# Patient Record
Sex: MALE | Race: WHITE | NOT HISPANIC OR LATINO | Employment: FULL TIME | URBAN - METROPOLITAN AREA
[De-identification: names, ages, dates, MRNs, and addresses within clinical notes are randomized per-mention and may not be internally consistent; named-entity substitution may affect disease eponyms.]

---

## 2017-02-27 DIAGNOSIS — E78.2 MIXED HYPERLIPIDEMIA: ICD-10-CM

## 2017-02-27 NOTE — TELEPHONE ENCOUNTER
Kristopher is requesting a refill of the following med:  Pending Prescriptions:                       Disp   Refills    simvastatin (ZOCOR) 40 MG tablet          90 tab*1            Sig: Take 1 tablet (40 mg) by mouth daily    Last Refill: 12/04/2016  Last Office Visit: 09/02/2016 with instructions to follow up with PCP in 1 year  Scheduled Office Visit: 03/24/2017 (Pre Op)    BJS can you sign for JCC?  Please Close Encounter If Approved.    Thank You,  Codie Terrazas, RASHEED

## 2017-02-28 RX ORDER — SIMVASTATIN 40 MG
40 TABLET ORAL DAILY
Qty: 90 TABLET | Refills: 1 | Status: SHIPPED | OUTPATIENT
Start: 2017-02-28 | End: 2017-09-22

## 2017-03-31 ENCOUNTER — TELEPHONE (OUTPATIENT)
Dept: SURGERY | Facility: CLINIC | Age: 57
End: 2017-03-31

## 2017-03-31 ENCOUNTER — OFFICE VISIT (OUTPATIENT)
Dept: FAMILY MEDICINE | Facility: CLINIC | Age: 57
End: 2017-03-31

## 2017-03-31 VITALS
DIASTOLIC BLOOD PRESSURE: 80 MMHG | HEART RATE: 57 BPM | WEIGHT: 267 LBS | BODY MASS INDEX: 33.2 KG/M2 | HEIGHT: 75 IN | TEMPERATURE: 97.7 F | OXYGEN SATURATION: 95 % | SYSTOLIC BLOOD PRESSURE: 110 MMHG

## 2017-03-31 DIAGNOSIS — J30.9 ALLERGIC RHINITIS, UNSPECIFIED ALLERGIC RHINITIS TRIGGER, UNSPECIFIED RHINITIS SEASONALITY: ICD-10-CM

## 2017-03-31 DIAGNOSIS — M17.11 PRIMARY OSTEOARTHRITIS OF RIGHT KNEE: ICD-10-CM

## 2017-03-31 DIAGNOSIS — N52.9 ERECTILE DYSFUNCTION, UNSPECIFIED ERECTILE DYSFUNCTION TYPE: ICD-10-CM

## 2017-03-31 DIAGNOSIS — K40.90 RIGHT INGUINAL HERNIA: ICD-10-CM

## 2017-03-31 DIAGNOSIS — Z01.818 PREOP GENERAL PHYSICAL EXAM: Primary | ICD-10-CM

## 2017-03-31 DIAGNOSIS — E78.2 MIXED HYPERLIPIDEMIA: ICD-10-CM

## 2017-03-31 LAB
ERYTHROCYTE [DISTWIDTH] IN BLOOD BY AUTOMATED COUNT: 12.2 %
HCT VFR BLD AUTO: 46.3 % (ref 40–53)
HEMOGLOBIN: 15.8 G/DL (ref 13.3–17.7)
MCH RBC QN AUTO: 29.8 PG (ref 26–33)
MCHC RBC AUTO-ENTMCNC: 34.1 G/DL (ref 31–36)
MCV RBC AUTO: 87.4 FL (ref 78–100)
PLATELET COUNT - QUEST: 159 10^9/L (ref 150–375)
RBC # BLD AUTO: 5.3 10*12/L (ref 4.4–5.9)
WBC # BLD AUTO: 6.3 10*9/L (ref 4–11)

## 2017-03-31 PROCEDURE — 85027 COMPLETE CBC AUTOMATED: CPT | Performed by: PHYSICIAN ASSISTANT

## 2017-03-31 PROCEDURE — 99214 OFFICE O/P EST MOD 30 MIN: CPT | Performed by: PHYSICIAN ASSISTANT

## 2017-03-31 PROCEDURE — 36415 COLL VENOUS BLD VENIPUNCTURE: CPT | Performed by: PHYSICIAN ASSISTANT

## 2017-03-31 PROCEDURE — 93000 ELECTROCARDIOGRAM COMPLETE: CPT | Performed by: PHYSICIAN ASSISTANT

## 2017-03-31 NOTE — LETTER
[facility]  [address]  [address]  [facility] [address]  [phone]  Dept: [phone]    PRE-OP EVALUATION:  Today's date: 3/31/2017    Kristopher Gomez (: 1960) presents for pre-operative evaluation assessment as requested by Dr. Ford.  He requires evaluation and anesthesia risk assessment prior to undergoing surgery/procedure for treatment of osteoarthritis right knee.  Proposed procedure: Total knee replacement right.     Date of Surgery/ Procedure: 2017  Time of Surgery/ Procedure:   Hospital/Surgical Facility: [facility]  Fax number for surgical facility: [phone]  Primary Physician: Talha Licona  Type of Anesthesia Anticipated: to be determined    Patient has a Health Care Directive or Living Will:  YES     2. NO - Do you ever have any pain or discomfort in your chest?  3. NO - Do you have a history of  Heart Failure?  4. NO - Are you troubled by shortness of breath when: walking on the level, up a slight hill or at night?  5. NO - Do you currently have a cold, bronchitis or other respiratory infection?  6. NO - Do you have a cough, shortness of breath or wheezing?  7. NO - Do you sometimes get pains in the calves of your legs when you walk?  8. NO - Do you or anyone in your family have previous history of blood clots?   9. NO - Do you or does anyone in your family have a serious bleeding problem such as prolonged bleeding following surgeries or cuts?  10. NO - Have you ever had problems with anemia or been told to take iron pills?  11. NO - Have you had any abnormal blood loss such as black, tarry or bloody stools, or abnormal vaginal bleeding?  12. NO - Have you ever had a blood transfusion?  13. NO - Have you or any of your relatives ever had problems with anesthesia?  14. NO - Do you have sleep apnea, excessive snoring or daytime drowsiness?  15. NO - Do you have any prosthetic heart valves?  16. NO - Do you  have prosthetic joints?  17. NO - Is there any chance that you may be pregnant?      HPI:                                                      Brief HPI related to upcoming procedure: Chronic pain of right knee      HYPERLIPIDEMIA - Patient has a long history of significant Hyperlipidemia requiring medication for treatment with recent good control. Patient reports no problems or side effects with the medication.                                                                                                                                                       .    MEDICAL HISTORY:                                                      Patient Active Problem List    Diagnosis Date Noted     Mixed hyperlipidemia 05/23/2003     Priority: High     Erectile dysfunction, unspecified erectile dysfunction type 09/02/2016     Priority: Medium     Right inguinal hernia 02/29/2016     Priority: Medium     Found in ED 2/16, CT proven       Allergic rhinitis 03/11/2014     Priority: Medium     Uses zyrtec         Family history of cardiovascular disease 05/18/2012     Priority: Medium     Primary osteoarthritis of right knee 09/07/2005     Priority: Medium     ACP (advance care planning) 12/05/2012     Priority: Low      Advance Care Planning 9/2/2016: ACP Review of Chart / Resources Provided:  Reviewed chart for advance care plan.  Kristopher Gomez has   Added by [facility] 12/05/2012     Priority: Low     State Tier Level:  Tier 1  Status:  n/a  Care Coordinator:   See Letters for [facility] Care Plan              Past Medical History:   Diagnosis Date     MIXED HYPERLIPIDEMIA 5/23/2003     Other internal derangement of knee(717.89) 1980    right medial meniscus tear s/p arthroscopy, debridement     Past Surgical History:   Procedure Laterality Date     HC FEMUR/KNEE SURG UNLISTED Right 1980    debridement of meniscal tear     HC VASECTOMY UNILAT/BILAT W POSTOP SEMEN      Vasectomy     Current  "Outpatient Prescriptions   Medication Sig Dispense Refill     simvastatin (ZOCOR) 40 MG tablet Take 1 tablet (40 mg) by mouth daily 90 tablet 1     fluticasone (FLONASE) 50 MCG/ACT nasal spray Spray 1-2 sprays into both nostrils daily 16 g 1     OTC products: Celebrex last night.    Allergies   Allergen Reactions     No Known Allergies       Latex Allergy: NO    Social History   Substance Use Topics     Smoking status: Never Smoker     Smokeless tobacco: Never Used     Alcohol use 1.0 oz/week     2 drink(s) per week     History   Drug Use No       REVIEW OF SYSTEMS:                                                    Constitutional, neuro, ENT, endocrine, pulmonary, cardiac, gastrointestinal, genitourinary, musculoskeletal, integument and psychiatric systems are negative, except as otherwise noted.    EXAM:                                                    /80 (BP Location: Left arm, Patient Position: Chair, Cuff Size: Adult Regular)  Pulse 57  Temp 97.7  F (36.5  C) (Oral)  Ht 1.905 m (6' 3\")  Wt 121.1 kg (267 lb)  SpO2 95%  BMI 33.37 kg/m2    GENERAL APPEARANCE: healthy, alert and no distress     EYES: EOMI,  PERRL     HENT: ear canals and TM's normal and nose and mouth without ulcers or lesions     NECK: no adenopathy, no asymmetry, masses, or scars and thyroid normal to palpation     RESP: lungs clear to auscultation - no rales, rhonchi or wheezes     CV: regular rates and rhythm, normal S1 S2, no S3 or S4 and no murmur, click or rub     ABDOMEN:  soft, nontender, no HSM or masses and bowel sounds normal     MS: extremities normal- no gross deformities noted, no evidence of inflammation in joints, FROM in all extremities.     SKIN: no suspicious lesions or rashes     NEURO: Normal strength and tone, sensory exam grossly normal, mentation intact and speech normal     PSYCH: mentation appears normal. and affect normal/bright    DIAGNOSTICS:                                                      EKG: " sinus bradycardia, normal axis, normal intervals, no acute ST/T changes c/w ischemia, no LVH by voltage criteria  Labs Resulted Today:   Results for orders placed or performed in visit on 03/31/17   HEMOGRAM/PLATELET (BFP)   Result Value Ref Range    WBC 6.3 4.0 - 11 10*9/L    RBC Count 5.30 4.4 - 5.9 10*12/L    Hemoglobin 15.8 13.3 - 17.7 g/dL    Hematocrit 46.3 40.0 - 53.0 %    MCV 87.4 78 - 100 fL    MCH 29.8 26 - 33 pg    MCHC 34.1 31 - 36 g/dL    RDW 12.2 %    Platelet Count 159 150 - 375 10^9/L       Recent Labs   Lab Test  09/02/16   0835  02/27/16   2227  01/13/16   1240  05/12/15   0856   HGB   --   13.6  14.4   --    PLT   --    --   287   --    NA  142  143   --   139   POTASSIUM  4.8  3.6   --   4.9   CR  1.12   --    --   1.11        IMPRESSION:                                                    Reason for surgery/procedure: right knee total replacement  Diagnosis/reason for consult:  Preoperative clearance      The proposed surgical procedure is considered INTERMEDIATE risk.    REVISED CARDIAC RISK INDEX  The patient has the following serious cardiovascular risks for perioperative complications such as (MI, PE, VFib and 3  AV Block):  No serious cardiac risks  INTERPRETATION: 0 risks: Class I (very low risk - 0.4% complication rate)    The patient has the following additional risks for perioperative complications:  No identified additional risks      ICD-10-CM    1. Preop general physical exam Z01.818 EKG 12-lead complete w/read - Clinics     HEMOGRAM/PLATELET (BFP)     VENOUS COLLECTION   2. Mixed hyperlipidemia E78.2    3. Primary osteoarthritis of right knee M17.11    4. Allergic rhinitis, unspecified allergic rhinitis trigger, unspecified rhinitis seasonality J30.9    5. Right inguinal hernia K40.90    6. Erectile dysfunction, unspecified erectile dysfunction type N52.9        RECOMMENDATIONS:                                                        APPROVAL GIVEN to proceed with proposed procedure,  without further diagnostic evaluation       Signed Electronically by: Opal Tariq PA-C    Copy of this evaluation report is provided to requesting physician.    [place] Preop Guidelines

## 2017-03-31 NOTE — MR AVS SNAPSHOT
After Visit Summary   3/31/2017    Kristopher Gomez    MRN: [id number]           Patient Information     Date Of Birth          1960        Visit Information        Provider Department      3/31/2017 9:45 AM Opal Tariq PA [facility]        Today's Diagnoses     Preop general physical exam    -  1    Mixed hyperlipidemia        Primary osteoarthritis of right knee        Allergic rhinitis, unspecified allergic rhinitis trigger, unspecified rhinitis seasonality        Right inguinal hernia        Erectile dysfunction, unspecified erectile dysfunction type           Follow-ups after your visit        Additional Services     GENERAL SURG ADULT REFERRAL       Your provider has referred you to: [facility]: [facility] [place] [phone]   [email or web address]    Please be aware that coverage of these services is subject to the terms and limitations of your health insurance plan.  Call member services at your health plan with any benefit or coverage questions.      Please bring the following with you to your appointment:    (1) Any X-Rays, CTs or MRIs which have been performed.  Contact the facility where they were done to arrange for  prior to your scheduled appointment.  Any new CT, MRI or other procedures ordered by your specialist must be performed at a [place] facility or coordinated by your clinic's referral office.    (2) List of current medications   (3) This referral request   (4) Any documents/labs given to you for this referral                  Who to contact     If you have questions or need follow up information about today's clinic visit or your schedule please contact [facility] directly at [phone].  Normal or non-critical lab and imaging results will be communicated to you by MyChart, letter or phone within 4 business days after the clinic has received the  "results. If you do not hear from us within 7 days, please contact the clinic through [employer] or phone. If you have a critical or abnormal lab result, we will notify you by phone as soon as possible.  Submit refill requests through [employer] or call your pharmacy and they will forward the refill request to us. Please allow 3 business days for your refill to be completed.          Additional Information About Your Visit        [employer] Information     [employer] gives you secure access to your electronic health record. If you see a primary care provider, you can also send messages to your care team and make appointments. If you have questions, please call your primary care clinic.  If you do not have a primary care provider, please call [phone] and they will assist you.        Care EveryWhere ID     This is your Care EveryWhere ID. This could be used by other organizations to access your [place] medical records  [id number]        Your Vitals Were     Pulse Temperature Height Pulse Oximetry BMI (Body Mass Index)       57 97.7  F (36.5  C) (Oral) 1.905 m (6' 3\") 95% 33.37 kg/m2        Blood Pressure from Last 3 Encounters:   03/31/17 110/80   09/02/16 124/78   02/27/16 124/86    Weight from Last 3 Encounters:   03/31/17 121.1 kg (267 lb)   09/02/16 117.1 kg (258 lb 3.2 oz)   02/27/16 115.7 kg (255 lb)              We Performed the Following     EKG 12-lead complete w/read - Clinics     GENERAL SURG ADULT REFERRAL     HEMOGRAM/PLATELET (BFP)     VENOUS COLLECTION        Primary Care Provider Office Phone # Fax #    Talha Licona [phone]       [facility] [address] [facility] [address]  [facility] [address]        Thank you!     Thank you for choosing [facility]  for your care. Our goal is always to provide you with excellent care. Hearing back from our patients is one way we can continue to improve our services. Please take a few minutes to complete " the written survey that you may receive in the mail after your visit with us. Thank you!             Your Updated Medication List - Protect others around you: Learn how to safely use, store and throw away your medicines at www.disposemymeds.org.          This list is accurate as of: 3/31/17 10:36 AM.  Always use your most recent med list.                   Brand Name Dispense Instructions for use    fluticasone 50 MCG/ACT spray    FLONASE    16 g    Spray 1-2 sprays into both nostrils daily       simvastatin 40 MG tablet    ZOCOR    90 tablet    Take 1 tablet (40 mg) by mouth daily

## 2017-03-31 NOTE — PROGRESS NOTES
[facility]  [address]  [address]  [facility] [address]  [phone]  Dept: [phone]    PRE-OP EVALUATION:  Today's date: 3/31/2017    Kristopher Gomez (: 1960) presents for pre-operative evaluation assessment as requested by Dr. Ford.  He requires evaluation and anesthesia risk assessment prior to undergoing surgery/procedure for treatment of osteoarthritis right knee.  Proposed procedure: Total knee replacement right.     Date of Surgery/ Procedure: 2017  Time of Surgery/ Procedure:   Hospital/Surgical Facility: [facility]  Fax number for surgical facility: [phone]  Primary Physician: Talha Licona  Type of Anesthesia Anticipated: to be determined    Patient has a Health Care Directive or Living Will:  YES     2. NO - Do you ever have any pain or discomfort in your chest?  3. NO - Do you have a history of  Heart Failure?  4. NO - Are you troubled by shortness of breath when: walking on the level, up a slight hill or at night?  5. NO - Do you currently have a cold, bronchitis or other respiratory infection?  6. NO - Do you have a cough, shortness of breath or wheezing?  7. NO - Do you sometimes get pains in the calves of your legs when you walk?  8. NO - Do you or anyone in your family have previous history of blood clots?   9. NO - Do you or does anyone in your family have a serious bleeding problem such as prolonged bleeding following surgeries or cuts?  10. NO - Have you ever had problems with anemia or been told to take iron pills?  11. NO - Have you had any abnormal blood loss such as black, tarry or bloody stools, or abnormal vaginal bleeding?  12. NO - Have you ever had a blood transfusion?  13. NO - Have you or any of your relatives ever had problems with anesthesia?  14. NO - Do you have sleep apnea, excessive snoring or daytime drowsiness?  15. NO - Do you have any prosthetic heart valves?  16. NO - Do you  have prosthetic joints?  17. NO - Is there any chance that you may be pregnant?      HPI:                                                      Brief HPI related to upcoming procedure: Chronic pain of right knee      HYPERLIPIDEMIA - Patient has a long history of significant Hyperlipidemia requiring medication for treatment with recent good control. Patient reports no problems or side effects with the medication.                                                                                                                                                       .    MEDICAL HISTORY:                                                      Patient Active Problem List    Diagnosis Date Noted     Mixed hyperlipidemia 05/23/2003     Priority: High     Erectile dysfunction, unspecified erectile dysfunction type 09/02/2016     Priority: Medium     Right inguinal hernia 02/29/2016     Priority: Medium     Found in ED 2/16, CT proven       Allergic rhinitis 03/11/2014     Priority: Medium     Uses zyrtec         Family history of cardiovascular disease 05/18/2012     Priority: Medium     Primary osteoarthritis of right knee 09/07/2005     Priority: Medium     ACP (advance care planning) 12/05/2012     Priority: Low      Advance Care Planning 9/2/2016: ACP Review of Chart / Resources Provided:  Reviewed chart for advance care plan.  Kristopher Gomez has   Added by [facility] 12/05/2012     Priority: Low     State Tier Level:  Tier 1  Status:  n/a  Care Coordinator:   See Letters for [facility] Care Plan              Past Medical History:   Diagnosis Date     MIXED HYPERLIPIDEMIA 5/23/2003     Other internal derangement of knee(717.89) 1980    right medial meniscus tear s/p arthroscopy, debridement     Past Surgical History:   Procedure Laterality Date     HC FEMUR/KNEE SURG UNLISTED Right 1980    debridement of meniscal tear     HC VASECTOMY UNILAT/BILAT W POSTOP SEMEN      Vasectomy     Current  "Outpatient Prescriptions   Medication Sig Dispense Refill     simvastatin (ZOCOR) 40 MG tablet Take 1 tablet (40 mg) by mouth daily 90 tablet 1     fluticasone (FLONASE) 50 MCG/ACT nasal spray Spray 1-2 sprays into both nostrils daily 16 g 1     OTC products: Celebrex last night.    Allergies   Allergen Reactions     No Known Allergies       Latex Allergy: NO    Social History   Substance Use Topics     Smoking status: Never Smoker     Smokeless tobacco: Never Used     Alcohol use 1.0 oz/week     2 drink(s) per week     History   Drug Use No       REVIEW OF SYSTEMS:                                                    Constitutional, neuro, ENT, endocrine, pulmonary, cardiac, gastrointestinal, genitourinary, musculoskeletal, integument and psychiatric systems are negative, except as otherwise noted.    EXAM:                                                    /80 (BP Location: Left arm, Patient Position: Chair, Cuff Size: Adult Regular)  Pulse 57  Temp 97.7  F (36.5  C) (Oral)  Ht 1.905 m (6' 3\")  Wt 121.1 kg (267 lb)  SpO2 95%  BMI 33.37 kg/m2    GENERAL APPEARANCE: healthy, alert and no distress     EYES: EOMI,  PERRL     HENT: ear canals and TM's normal and nose and mouth without ulcers or lesions     NECK: no adenopathy, no asymmetry, masses, or scars and thyroid normal to palpation     RESP: lungs clear to auscultation - no rales, rhonchi or wheezes     CV: regular rates and rhythm, normal S1 S2, no S3 or S4 and no murmur, click or rub     ABDOMEN:  soft, nontender, no HSM or masses and bowel sounds normal     MS: extremities normal- no gross deformities noted, no evidence of inflammation in joints, FROM in all extremities.     SKIN: no suspicious lesions or rashes     NEURO: Normal strength and tone, sensory exam grossly normal, mentation intact and speech normal     PSYCH: mentation appears normal. and affect normal/bright    DIAGNOSTICS:                                                      EKG: " sinus bradycardia, normal axis, normal intervals, no acute ST/T changes c/w ischemia, no LVH by voltage criteria  Labs Resulted Today:   Results for orders placed or performed in visit on 03/31/17   HEMOGRAM/PLATELET (BFP)   Result Value Ref Range    WBC 6.3 4.0 - 11 10*9/L    RBC Count 5.30 4.4 - 5.9 10*12/L    Hemoglobin 15.8 13.3 - 17.7 g/dL    Hematocrit 46.3 40.0 - 53.0 %    MCV 87.4 78 - 100 fL    MCH 29.8 26 - 33 pg    MCHC 34.1 31 - 36 g/dL    RDW 12.2 %    Platelet Count 159 150 - 375 10^9/L       Recent Labs   Lab Test  09/02/16   0835  02/27/16   2227  01/13/16   1240  05/12/15   0856   HGB   --   13.6  14.4   --    PLT   --    --   287   --    NA  142  143   --   139   POTASSIUM  4.8  3.6   --   4.9   CR  1.12   --    --   1.11        IMPRESSION:                                                    Reason for surgery/procedure: right knee total replacement  Diagnosis/reason for consult:  Preoperative clearance      The proposed surgical procedure is considered INTERMEDIATE risk.    REVISED CARDIAC RISK INDEX  The patient has the following serious cardiovascular risks for perioperative complications such as (MI, PE, VFib and 3  AV Block):  No serious cardiac risks  INTERPRETATION: 0 risks: Class I (very low risk - 0.4% complication rate)    The patient has the following additional risks for perioperative complications:  No identified additional risks      ICD-10-CM    1. Preop general physical exam Z01.818 EKG 12-lead complete w/read - Clinics     HEMOGRAM/PLATELET (BFP)     VENOUS COLLECTION   2. Mixed hyperlipidemia E78.2    3. Primary osteoarthritis of right knee M17.11    4. Allergic rhinitis, unspecified allergic rhinitis trigger, unspecified rhinitis seasonality J30.9    5. Right inguinal hernia K40.90    6. Erectile dysfunction, unspecified erectile dysfunction type N52.9        RECOMMENDATIONS:                                                        APPROVAL GIVEN to proceed with proposed procedure,  without further diagnostic evaluation       Signed Electronically by: Opal Tariq PA-C    Copy of this evaluation report is provided to requesting physician.    [place] Preop Guidelines

## 2017-04-06 NOTE — TELEPHONE ENCOUNTER
Attempt #2:    Called patient at [phone] .  No answer - left message for patient to return call to clinic at [phone].  Imelda

## 2017-04-07 ENCOUNTER — OFFICE VISIT (OUTPATIENT)
Dept: FAMILY MEDICINE | Facility: CLINIC | Age: 57
End: 2017-04-07

## 2017-04-07 VITALS
OXYGEN SATURATION: 95 % | DIASTOLIC BLOOD PRESSURE: 70 MMHG | HEART RATE: 76 BPM | SYSTOLIC BLOOD PRESSURE: 110 MMHG | TEMPERATURE: 98 F | RESPIRATION RATE: 16 BRPM

## 2017-04-07 DIAGNOSIS — R50.9 FEVER, UNSPECIFIED: Primary | ICD-10-CM

## 2017-04-07 LAB
ERYTHROCYTE [DISTWIDTH] IN BLOOD BY AUTOMATED COUNT: 12 %
HCT VFR BLD AUTO: 33.5 % (ref 40–53)
HEMOGLOBIN: 11 G/DL (ref 13.3–17.7)
MCH RBC QN AUTO: 28.9 PG (ref 26–33)
MCHC RBC AUTO-ENTMCNC: 32.8 G/DL (ref 31–36)
MCV RBC AUTO: 87.9 FL (ref 78–100)
PLATELET COUNT - QUEST: 252 10^9/L (ref 150–375)
RBC # BLD AUTO: 3.81 10*12/L (ref 4.4–5.9)
WBC # BLD AUTO: 12.2 10*9/L (ref 4–11)

## 2017-04-07 PROCEDURE — 99213 OFFICE O/P EST LOW 20 MIN: CPT | Performed by: PHYSICIAN ASSISTANT

## 2017-04-07 PROCEDURE — 36415 COLL VENOUS BLD VENIPUNCTURE: CPT | Performed by: PHYSICIAN ASSISTANT

## 2017-04-07 PROCEDURE — 85027 COMPLETE CBC AUTOMATED: CPT | Performed by: PHYSICIAN ASSISTANT

## 2017-04-07 RX ORDER — MORPHINE SULFATE 30 MG/1
TABLET, FILM COATED, EXTENDED RELEASE ORAL
COMMUNITY
Start: 2017-04-05 | End: 2017-09-22

## 2017-04-07 RX ORDER — CEPHALEXIN 500 MG/1
CAPSULE ORAL
COMMUNITY
Start: 2017-04-05 | End: 2017-09-22

## 2017-04-07 RX ORDER — OXYCODONE HYDROCHLORIDE 5 MG/1
TABLET ORAL
COMMUNITY
Start: 2017-04-05 | End: 2017-09-22

## 2017-04-07 RX ORDER — ONDANSETRON 8 MG/1
TABLET, FILM COATED ORAL
Refills: 0 | COMMUNITY
Start: 2017-02-06 | End: 2017-09-22

## 2017-04-07 NOTE — PROGRESS NOTES
CC: Fever, hypoxia    History:  This past Wed, Kristopher had TKR of right knee through [facility]. Since yesterday when he got home he and he wife Giuliana noticed he was short of breath and he could hear and feel a rattling in his airway. This rattling and shortness of breath get worse when laying down. Through [facility], Kristopher has a home care nurse that comes to check on him. Earlier today he was found to have a fever of 102 degrees at 1 o'clock, and his oxygen level was 82%. Has been between  since that time, and shortness of breath has been improving as well. Took 500 mg Tylenol at 9 and 1. Kristopher is also on morphine and oxycodone per instructions from his ortho team he will stop morphine tomorrow. He is also on Keflex for prophylaxis.     No history of pulmonary disease, blood clots. Wearing compression boots to help prevent clot. No pain in legs currently. Right knee feels good.    PMH, MEDICATIONS, ALLERGIES, SOCIAL AND FAMILY HISTORY in [facility] and reviewed by me personally.      ROS negative other than the symptoms noted above in the HPI.        Examination   /70 (BP Location: Right arm, Patient Position: Chair, Cuff Size: Adult Large)  Pulse 76  Temp 98  F (36.7  C) (Oral)  Resp 16  SpO2 95%       Constitutional: Sitting comfortably, in no acute distress. Vital signs noted. Hypoxia improved to normal. Temperature normal.  Cardiovascular:  regular rate and rhythm, no murmurs, clicks, or gallops  Respiratory:  normal respiratory rate and rhythm, lungs clear to auscultation  Psychiatric: mentation appears normal and affect normal/bright        A/P    ICD-10-CM    1. Fever, unspecified R50.9 HEMOGRAM/PLATELET (BFP)     VENOUS COLLECTION       DISCUSSION: CBC shows hemoglobin of 11, which per pt is what it was when he was discharged. Also shows mildly elevated WBC of 12, which he believes is similar to at discharge. Explained to Kristopher and wife Giuliana that his improved vital signs in office today compared to during home  care are reassuring. He is no longer feeling short of breath, although I did ask them to monitor closely tonight, as it tends to get worse at night. He should sleep elevated.     I encouraged them to spread out morphine and oxycodone doses as much as possible to limit respiratory depression. I do not think Kristopher needs to proceed to ER at this time, but if his shortness of breath returns and even worsens, they should proceed to the ER. His WBC was borderline so I could not rule out infection despite Keflex, so should monitor for signs of sweats, breakthrough fever despite regular Tylenol use, spreading redness, and if they have any concerns they can contact us as well.     follow up visit: As needed    Miesha Lyons PA-C  [facility]

## 2017-04-07 NOTE — NURSING NOTE
Patient is here for a fever that he has had since surgery on Wed - O2 was a little low - has been under 100 till today it went over 102 then took Tylenol.  Pre-Visit Screening not done today.  Pulse - regular  My Chart - accepts    CLASSIFICATION OF OVERWEIGHT AND OBESITY BY BMI                         Obesity Class           BMI(kg/m2)  Underweight                                    < 18.5  Normal                                         18.5-24.9  Overweight                                     25.0-29.9  OBESITY                     I                  30.0-34.9                              II                 35.0-39.9  EXTREME OBESITY             III                >40                             Patient's  BMI There is no height or weight on file to calculate BMI.  http://hin.nhlbi.nih.gov/menuplanner/menu.cgi  Questioned patient about current smoking habits.  Pt. has never smoked.      Patient was unable to have their weight and height checked for the following reason knee surgery and because of this we are not able to calculate a BMI.

## 2017-04-07 NOTE — MR AVS SNAPSHOT
After Visit Summary   4/7/2017    Kristopher Gomez    MRN: [id number]           Patient Information     Date Of Birth          1960        Visit Information        Provider Department      4/7/2017 3:00 PM Miesha Lyons PA-C [facility]        Today's Diagnoses     Fever, unspecified    -  1       Follow-ups after your visit        Follow-up notes from your care team     Return if symptoms worsen or fail to improve.      Who to contact     If you have questions or need follow up information about today's clinic visit or your schedule please contact [facility] directly at [phone].  Normal or non-critical lab and imaging results will be communicated to you by MyChart, letter or phone within 4 business days after the clinic has received the results. If you do not hear from us within 7 days, please contact the clinic through Open-Xchangehart or phone. If you have a critical or abnormal lab result, we will notify you by phone as soon as possible.  Submit refill requests through M.dot or call your pharmacy and they will forward the refill request to us. Please allow 3 business days for your refill to be completed.          Additional Information About Your Visit        MyChart Information     M.dot gives you secure access to your electronic health record. If you see a primary care provider, you can also send messages to your care team and make appointments. If you have questions, please call your primary care clinic.  If you do not have a primary care provider, please call [phone] and they will assist you.        Care EveryWhere ID     This is your Care EveryWhere ID. This could be used by other organizations to access your [place] medical records  [id number]        Your Vitals Were     Pulse Temperature Respirations Pulse Oximetry          76 98  F (36.7  C) (Oral) 16 95%         Blood Pressure from Last 3 Encounters:   04/07/17 110/70    03/31/17 110/80   09/02/16 124/78    Weight from Last 3 Encounters:   03/31/17 121.1 kg (267 lb)   09/02/16 117.1 kg (258 lb 3.2 oz)   02/27/16 115.7 kg (255 lb)              We Performed the Following     HEMOGRAM/PLATELET (BFP)     VENOUS COLLECTION        Primary Care Provider Office Phone # Fax #    Talha Licona [phone]       [facility] [address] [facility] [address]  [facility] [address]        Thank you!     Thank you for choosing [facility]  for your care. Our goal is always to provide you with excellent care. Hearing back from our patients is one way we can continue to improve our services. Please take a few minutes to complete the written survey that you may receive in the mail after your visit with us. Thank you!             Your Updated Medication List - Protect others around you: Learn how to safely use, store and throw away your medicines at www.disposemymeds.org.          This list is accurate as of: 4/7/17  6:08 PM.  Always use your most recent med list.                   Brand Name Dispense Instructions for use    aspirin 81 MG tablet      Take 81 mg by mouth 2 times daily       CALCIUM 600 PO      Take 1,800 mg by mouth       cephALEXin 500 MG capsule    KEFLEX         fluticasone 50 MCG/ACT spray    FLONASE    16 g    Spray 1-2 sprays into both nostrils daily       morphine 30 MG 12 hr tablet    MS CONTIN         ondansetron 8 MG tablet    ZOFRAN     TAKE 1 TABLET BY MOUTH EVERY 8 HOURS AS NEEDED FOR NAUSEA.       oxyCODONE 5 MG IR tablet    ROXICODONE         simvastatin 40 MG tablet    ZOCOR    90 tablet    Take 1 tablet (40 mg) by mouth daily       TYLENOL PO      Take 500 mg by mouth       VITAMIN D-3 PO

## 2017-04-18 NOTE — TELEPHONE ENCOUNTER
Attempt #3:    Called patient at [phone] .  No answer - left message for patient to return call to clinic at [phone].      No return call received from patient - closing encounter. Imelda

## 2017-04-20 ENCOUNTER — TRANSFERRED RECORDS (OUTPATIENT)
Dept: FAMILY MEDICINE | Facility: CLINIC | Age: 57
End: 2017-04-20

## 2017-05-04 ENCOUNTER — OFFICE VISIT (OUTPATIENT)
Dept: SURGERY | Facility: CLINIC | Age: 57
End: 2017-05-04
Payer: COMMERCIAL

## 2017-05-04 VITALS
HEART RATE: 90 BPM | BODY MASS INDEX: 32.33 KG/M2 | DIASTOLIC BLOOD PRESSURE: 80 MMHG | HEIGHT: 75 IN | OXYGEN SATURATION: 98 % | SYSTOLIC BLOOD PRESSURE: 138 MMHG | WEIGHT: 260 LBS

## 2017-05-04 DIAGNOSIS — K40.90 RIGHT INGUINAL HERNIA: Primary | ICD-10-CM

## 2017-05-04 PROCEDURE — 99203 OFFICE O/P NEW LOW 30 MIN: CPT | Performed by: SURGERY

## 2017-05-04 ASSESSMENT — ENCOUNTER SYMPTOMS: ARTHRALGIAS: 1

## 2017-05-04 NOTE — LETTER
May 4, 2017      RE:  Kristopher Gomez-:  [birth year]    HPI:  Kristopher is a 56 year old male who presents for evaluation of right groin bulge.  He feels pressure and movement particularly with physical activity or coughing. The patient has noticed a bulge. The patient has not had a previous herniorrhaphy in this location. Employment does not require heavy lifting.     Constipation: No  Colonoscopy: Yes   Dysuria: No  Cough: No  Diabetes: No     Past Medical History:  Has a past medical history of MIXED HYPERLIPIDEMIA (2003) and Other internal derangement of knee(717.89) ().     ROS:  The 10 point review of systems is negative other than noted in the HPI and above.     PE:    General- Well-developed, well-nourished, patient able to get up on table without difficulty.  HEENT- Normocephalic and atraumatic. Pupils equal and round. Mucous membranes moist. Sclera are nonicteric.  Neck- No lymphadenopathy or masses   Respirations- are regular and non labored  Abdomen is abdomen is soft without significant tenderness, masses, organomegaly or guarding  Hernia- Left inguinal hernia is not present with valsalva  Right inguinal hernia is present with valsalva  The hernia is reducible  Testicles are normal  Varix- bilateral present     Imaging: CT 2016 shows a small right inguinal hernia with a loop of nondilated small bowel within it.     Assesment:     Plan:   We have discussed observation, reduction techniques and importance, incarceration and strangulation signs, symptoms and importance as well as need to seek emergency treatment.   We have discussed surgery in detail, including risk, benefits, complications, mesh, infection, nerve and cord damage and their sequelae including chronic pain and testicular loss, lifting and activity limits after surgery. He has been given literature to review. We will schedule surgery at patient's convenience.        Reginaldo Bearden MD

## 2017-05-04 NOTE — PROGRESS NOTES
HPI      ROS (Review of Systems):      Positive for System Review.   Cardiovascular: Positive for hyperlipidemia.   MUSCULOSKELETAL: Positive for arthralgias.  System Review has been done        Physical Exam

## 2017-05-04 NOTE — MR AVS SNAPSHOT
"              After Visit Summary   5/4/2017    Kristopher Gomez    MRN: [id number]           Patient Information     Date Of Birth          1960        Visit Information        Provider Department      5/4/2017 2:30 PM Reginaldo Bearden MD [facility]      Today's Diagnoses     Right inguinal hernia    -  1       Follow-ups after your visit        Who to contact     If you have questions or need follow up information about today's clinic visit or your schedule please contact [facility] directly at [phone].  Normal or non-critical lab and imaging results will be communicated to you by [employer] Grouphart, letter or phone within 4 business days after the clinic has received the results. If you do not hear from us within 7 days, please contact the clinic through DataLockert or phone. If you have a critical or abnormal lab result, we will notify you by phone as soon as possible.  Submit refill requests through Tae[employer] Reseach or call your pharmacy and they will forward the refill request to us. Please allow 3 business days for your refill to be completed.          Additional Information About Your Visit        MyChart Information     T[employer] Reseach gives you secure access to your electronic health record. If you see a primary care provider, you can also send messages to your care team and make appointments. If you have questions, please call your primary care clinic.  If you do not have a primary care provider, please call [phone] and they will assist you.        Care EveryWhere ID     This is your Care EveryWhere ID. This could be used by other organizations to access your [place] medical records  [id number]        Your Vitals Were     Pulse Height Pulse Oximetry BMI (Body Mass Index)          90 6' 3\" (1.905 m) 98% 32.5 kg/m2         Blood Pressure from Last 3 Encounters:   05/04/17 138/80   04/07/17 110/70   03/31/17 110/80    Weight from Last 3 " Encounters:   05/04/17 260 lb (117.9 kg)   03/31/17 267 lb (121.1 kg)   09/02/16 258 lb 3.2 oz (117.1 kg)              Today, you had the following     No orders found for display       Primary Care Provider Office Phone # Fax #    Talha Licona [phone]       [facility] [address] [facility] [address]  [facility] [address]        Thank you!     Thank you for choosing [facility]  for your care. Our goal is always to provide you with excellent care. Hearing back from our patients is one way we can continue to improve our services. Please take a few minutes to complete the written survey that you may receive in the mail after your visit with us. Thank you!             Your Updated Medication List - Protect others around you: Learn how to safely use, store and throw away your medicines at www.disposemymeds.org.          This list is accurate as of: 5/4/17  2:38 PM.  Always use your most recent med list.                   Brand Name Dispense Instructions for use    aspirin 81 MG tablet      Take 81 mg by mouth 2 times daily       CALCIUM 600 PO      Take 1,800 mg by mouth       cephALEXin 500 MG capsule    KEFLEX     Reported on 5/4/2017       fluticasone 50 MCG/ACT spray    FLONASE    16 g    Spray 1-2 sprays into both nostrils daily       morphine 30 MG 12 hr tablet    MS CONTIN     Reported on 5/4/2017       ondansetron 8 MG tablet    ZOFRAN     Reported on 5/4/2017       oxyCODONE 5 MG IR tablet    ROXICODONE     Reported on 5/4/2017       simvastatin 40 MG tablet    ZOCOR    90 tablet    Take 1 tablet (40 mg) by mouth daily       TYLENOL PO      Take 500 mg by mouth       VITAMIN D-3 PO

## 2017-05-04 NOTE — PROGRESS NOTES
HPI:  Kristopher is a 56 year old male who presents for evaluation of right groin bulge.  He feels pressure and movement particularly with physical activity or coughing.  The patient has noticed a bulge. The patient has not had a previous herniorrhaphy in this location. Employment does not require heavy lifting.    Constipation: No  Colonoscopy: Yes 2012  Dysuria: No  Cough: No  Diabetes: No    Past Medical History:   has a past medical history of MIXED HYPERLIPIDEMIA (5/23/2003) and Other internal derangement of knee(717.89) (1980).    Past Surgical History:  Past Surgical History:   Procedure Laterality Date     HC FEMUR/KNEE SURG UNLISTED Right 1980    debridement of meniscal tear     HC VASECTOMY UNILAT/BILAT W POSTOP SEMEN      Vasectomy      Additional abdominal operations: none    Social History:  Social History     Social History     Marital status:      Spouse name: Giuliana     Number of children: 2     Years of education: 16     Occupational History     PHYSICAL THERAPIST [employer]      Social History Main Topics     Smoking status: Never Smoker     Smokeless tobacco: Never Used     Alcohol use 1.0 oz/week     2 drink(s) per week     Drug use: No     Sexual activity: Yes     Partners: Female      Comment: vasectomy     Other Topics Concern     Exercise Yes     Seat Belt Yes     Self-Exams Yes     Parent/Sibling W/ Cabg, Mi Or Angioplasty Before 65f 55m? Yes     Social History Narrative        Family History:  Family History   Problem Relation Age of Onset     CANCER Mother 70     peritoneal     HEART DISEASE Father      MI x 10, cab x 2--ages 36 and 44     DIABETES Father      Cancer - colorectal No family hx of      Prostate Cancer No family hx of      Hernias: No    ROS:  The 10 point review of systems is negative other than noted in the HPI and above.    PE:    General- Well-developed, well-nourished, patient able to get up on table without difficulty.  HEENT- Normocephalic and atraumatic. Pupils  equal and round.  Mucous membranes moist.  Sclera are nonicteric.  Neck- No lymphadenopathy or masses   Respirations- are regular and non labored  Abdomen is abdomen is soft without significant tenderness, masses, organomegaly or guarding  Hernia- Left inguinal hernia is not present with valsalva              Right inguinal hernia is present with valsalva              The hernia is reducible              Testicles are normal              Varix- bilateral present    Imaging: CT 2/27/2016 shows a small right inguinal hernia with a loop of nondilated small bowel within it.    Assesment:    Plan:    We have discussed observation, reduction techniques and importance, incarceration and strangulation signs, symptoms and importance as well as need to seek emergency treatment.    We have discussed surgery in detail, including risk, benefits, complications, mesh, infection, nerve and cord damage and their sequelae including chronic pain and testicular loss, lifting and activity limits after surgery. He has been given literature to review. We will schedule surgery at patient's convenience.    Time spent with the patient with greater that 50% of the time in discussion was 30 minutes.     Reginaldo Bearden MD    Please route or send letter to:  Primary Care Provider (PCP) and Include Progress Note

## 2017-05-17 ENCOUNTER — TRANSFERRED RECORDS (OUTPATIENT)
Dept: FAMILY MEDICINE | Facility: CLINIC | Age: 57
End: 2017-05-17

## 2017-08-03 ENCOUNTER — TELEPHONE (OUTPATIENT)
Dept: FAMILY MEDICINE | Facility: CLINIC | Age: 57
End: 2017-08-03

## 2017-09-22 DIAGNOSIS — E78.2 MIXED HYPERLIPIDEMIA: ICD-10-CM

## 2017-09-22 RX ORDER — SIMVASTATIN 40 MG
40 TABLET ORAL DAILY
Qty: 30 TABLET | Refills: 0 | COMMUNITY
Start: 2017-09-22 | End: 2017-10-06

## 2017-09-22 RX ORDER — SIMVASTATIN 40 MG
TABLET ORAL
Qty: 90 TABLET | Refills: 1 | OUTPATIENT
Start: 2017-09-22

## 2017-09-22 NOTE — TELEPHONE ENCOUNTER
Ok refill of simvastatin for 1 month to [facility]. Pt needs fasting OV for refills.    Thanks,Riri  [phone] (home)

## 2017-10-06 ENCOUNTER — OFFICE VISIT (OUTPATIENT)
Dept: FAMILY MEDICINE | Facility: CLINIC | Age: 57
End: 2017-10-06

## 2017-10-06 VITALS
HEIGHT: 75 IN | HEART RATE: 72 BPM | SYSTOLIC BLOOD PRESSURE: 126 MMHG | DIASTOLIC BLOOD PRESSURE: 80 MMHG | WEIGHT: 255.2 LBS | BODY MASS INDEX: 31.73 KG/M2 | RESPIRATION RATE: 20 BRPM | TEMPERATURE: 97 F

## 2017-10-06 DIAGNOSIS — Z01.818 PRE-OP EXAM: Primary | ICD-10-CM

## 2017-10-06 DIAGNOSIS — Z23 NEED FOR VACCINATION: ICD-10-CM

## 2017-10-06 DIAGNOSIS — E78.2 MIXED HYPERLIPIDEMIA: ICD-10-CM

## 2017-10-06 LAB
ERYTHROCYTE [DISTWIDTH] IN BLOOD BY AUTOMATED COUNT: 14.8 %
HCT VFR BLD AUTO: 49.8 % (ref 40–53)
HEMOGLOBIN: 15.9 G/DL (ref 13.3–17.7)
MCH RBC QN AUTO: 27.4 PG (ref 26–33)
MCHC RBC AUTO-ENTMCNC: 31.9 G/DL (ref 31–36)
MCV RBC AUTO: 85.7 FL (ref 78–100)
PLATELET COUNT - QUEST: 301 10^9/L (ref 150–375)
RBC # BLD AUTO: 5.81 10*12/L (ref 4.4–5.9)
WBC # BLD AUTO: 5 10*9/L (ref 4–11)

## 2017-10-06 PROCEDURE — 80053 COMPREHEN METABOLIC PANEL: CPT | Mod: 90 | Performed by: PHYSICIAN ASSISTANT

## 2017-10-06 PROCEDURE — 85027 COMPLETE CBC AUTOMATED: CPT | Performed by: PHYSICIAN ASSISTANT

## 2017-10-06 PROCEDURE — 90686 IIV4 VACC NO PRSV 0.5 ML IM: CPT | Performed by: PHYSICIAN ASSISTANT

## 2017-10-06 PROCEDURE — 90471 IMMUNIZATION ADMIN: CPT | Performed by: PHYSICIAN ASSISTANT

## 2017-10-06 PROCEDURE — 99214 OFFICE O/P EST MOD 30 MIN: CPT | Mod: 25 | Performed by: PHYSICIAN ASSISTANT

## 2017-10-06 PROCEDURE — 80061 LIPID PANEL: CPT | Mod: 90 | Performed by: PHYSICIAN ASSISTANT

## 2017-10-06 PROCEDURE — 36415 COLL VENOUS BLD VENIPUNCTURE: CPT | Performed by: PHYSICIAN ASSISTANT

## 2017-10-06 RX ORDER — SIMVASTATIN 40 MG
40 TABLET ORAL DAILY
Qty: 90 TABLET | Refills: 3 | Status: SHIPPED | OUTPATIENT
Start: 2017-10-06 | End: 2018-10-25

## 2017-10-06 RX ORDER — CELECOXIB 200 MG/1
200 CAPSULE ORAL 2 TIMES DAILY PRN
Qty: 60 CAPSULE | Refills: 1 | COMMUNITY
Start: 2017-10-06 | End: 2021-01-22

## 2017-10-06 NOTE — NURSING NOTE
Kristopher Gomez is here for  Pre-op exam.  Questioned patient about current smoking habits.  Pt. has never smoked.  PULSE regular  My Chart: active  CLASSIFICATION OF OVERWEIGHT AND OBESITY BY BMI                        Obesity Class           BMI(kg/m2)  Underweight                                    < 18.5  Normal                                         18.5-24.9  Overweight                                     25.0-29.9  OBESITY                     I                  30.0-34.9                             II                 35.0-39.9  EXTREME OBESITY             III                >40                            Patient's  BMI Body mass index is 31.9 kg/(m^2).  http://hin.nhlbi.nih.gov/menuplanner/menu.cgi  Pre-visit planning  Immunizations - not up to date - flu  Colonoscopy - is completed today  Mammogram -   Asthma -   PHQ9 -    STEVAN-7 -

## 2017-10-06 NOTE — MR AVS SNAPSHOT
After Visit Summary   10/6/2017    Kristopher Gomez    MRN: [id number]           Patient Information     Date Of Birth          1960        Visit Information        Provider Department      10/6/2017 10:30 AM Miesha Lyons PA-C [facility]        Today's Diagnoses     Pre-op exam    -  1    Mixed hyperlipidemia        Need for vaccination           Follow-ups after your visit        Follow-up notes from your care team     Return if symptoms worsen or fail to improve.      Your next 10 appointments already scheduled     Nov 03, 2017  7:30 AM CDT   [facility] Same Day Surgery with Reginaldo Bearden MD, Gloria Gavin PA-C   [facility] ([facility])    [facility] ([facility])   [phone]            Nov 03, 2017   Procedure with Reginaldo Bearden MD   [facility] PeriOp Services (--)    [address]  [facility] [id number] [phone]              Who to contact     If you have questions or need follow up information about today's clinic visit or your schedule please contact [facility] directly at [phone].  Normal or non-critical lab and imaging results will be communicated to you by MyChart, letter or phone within 4 business days after the clinic has received the results. If you do not hear from us within 7 days, please contact the clinic through MyChart or phone. If you have a critical or abnormal lab result, we will notify you by phone as soon as possible.  Submit refill requests through Gramco or call your pharmacy and they will forward the refill request to us. Please allow 3 business days for your refill to be completed.          Additional Information About Your Visit        PlayhouseSquarehart Information     Gramco gives you secure access to your electronic health record. If you see a primary care provider, you can also send messages  "to your care team and make appointments. If you have questions, please call your primary care clinic.  If you do not have a primary care provider, please call [phone] and they will assist you.        Care EveryWhere ID     This is your Care EveryWhere ID. This could be used by other organizations to access your [place] medical records  [id number]        Your Vitals Were     Pulse Temperature Respirations Height BMI (Body Mass Index)       72 97  F (36.1  C) (Oral) 20 1.905 m (6' 3\") 31.9 kg/m2        Blood Pressure from Last 3 Encounters:   10/06/17 126/80   05/04/17 138/80   04/07/17 110/70    Weight from Last 3 Encounters:   10/06/17 115.8 kg (255 lb 3.2 oz)   05/04/17 117.9 kg (260 lb)   03/31/17 121.1 kg (267 lb)              We Performed the Following     COMPREHENSIVE METABOLIC PANEL ([facility]) XCMP     HC FLU VAC PRESRV FREE QUAD SPLIT VIR 3+YRS IM     HEMOGRAM/PLATELET (BFP)     Lipid Profile ([facility])     VACCINE ADMINISTRATION, INITIAL     VENOUS COLLECTION          Where to get your medicines      These medications were sent to [place] [address] [place] [id number] [address]    Hours:  Tech issues with their phone system Phone:  [phone]     simvastatin 40 MG tablet          Primary Care Provider Office Phone # Fax #    Talha Licona [phone] [address] [address]        Equal Access to Services     [facility] AH: Hadii aad ku hadasho Soomaali, waaxda luqadaha, qaybta kaalmada adeegyada, waxay kan elliott. So [facility] [phone].    ATENCIÓN: Si averyla tayo, tiene a easton disposición servicios gratuitos de asistencia lingüística. Llame al [phone].    We comply with applicable federal civil rights laws and [place] laws. We do not discriminate on the basis of race, color, national origin, age, disability, sex, sexual orientation, or gender identity.          "   Thank you!     Thank you for choosing [facility]  for your care. Our goal is always to provide you with excellent care. Hearing back from our patients is one way we can continue to improve our services. Please take a few minutes to complete the written survey that you may receive in the mail after your visit with us. Thank you!             Your Updated Medication List - Protect others around you: Learn how to safely use, store and throw away your medicines at www.disposemymeds.org.          This list is accurate as of: 10/6/17 11:59 PM.  Always use your most recent med list.                   Brand Name Dispense Instructions for use Diagnosis    aspirin 81 MG tablet      Take 81 mg by mouth 2 times daily        CALCIUM 600 PO      Take 1,800 mg by mouth        celeBREX 200 MG capsule   Generic drug:  celecoxib     60 capsule    Take 1 capsule (200 mg) by mouth 2 times daily as needed for moderate pain        fluticasone 50 MCG/ACT spray    FLONASE    16 g    Spray 1-2 sprays into both nostrils daily    Nasal drainage       simvastatin 40 MG tablet    ZOCOR    90 tablet    Take 1 tablet (40 mg) by mouth daily    Mixed hyperlipidemia       TYLENOL PO      Take 500 mg by mouth        VITAMIN D-3 PO

## 2017-10-06 NOTE — PROGRESS NOTES
[facility]  [address]  [address]  [facility] [address]  [phone]  Dept: [phone]    PRE-OP EVALUATION:  Today's date: 10/6/2017    Kristopher Gomez (: 1960) presents for pre-operative evaluation assessment as requested by Dr. Bearden.  He requires evaluation and anesthesia risk assessment prior to undergoing surgery/procedure for treatment of inguinal hernia .  Proposed procedure: herniorrhaphy inguinal    Date of Surgery/ Procedure: 2017  Time of Surgery/ Procedure: AM  Hospital/Surgical Facility: [facility]  Fax number for surgical facility: Not needed  Primary Physician: Talha Licona  Type of Anesthesia Anticipated: General    Patient has a Health Care Directive or Living Will:  YES     1. NO - Do you have a history of heart attack, stroke, stent, bypass or surgery on an artery in the head, neck, heart or legs?  2. NO - Do you ever have any pain or discomfort in your chest?  3. NO - Do you have a history of  Heart Failure?  4. NO - Are you troubled by shortness of breath when: walking on the level, up a slight hill or at night?  5. NO - Do you currently have a cold, bronchitis or other respiratory infection?  6. NO - Do you have a cough, shortness of breath or wheezing?  7. NO - Do you sometimes get pains in the calves of your legs when you walk?  8. NO - Do you or anyone in your family have previous history of blood clots?  9. NO - Do you or does anyone in your family have a serious bleeding problem such as prolonged bleeding following surgeries or cuts?  10. NO - Have you ever had problems with anemia or been told to take iron pills?  11. NO - Have you had any abnormal blood loss such as black, tarry or bloody stools, or abnormal vaginal bleeding?  12. NO - Have you ever had a blood transfusion?  13. NO - Have you or any of your relatives ever had problems with anesthesia?  14. NO - Do you have sleep apnea, excessive snoring or  daytime drowsiness?  15. NO - Do you have any prosthetic heart valves?  16. YES - DO YOU HAVE PROSTHETIC JOINTS? Right knee replacement 4/2017  17. NO - Is there any chance that you may be pregnant?        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 Overall not painful, but does get tender while doing sit-ups. Takes LD ASA- will stop 5 days before procedure.      See problem list for active medical problems.  Problems all longstanding and stable, except as noted/documented.  See ROS for pertinent symptoms related to these conditions.                                                                                                  .    MEDICAL HISTORY:                                                    Patient Active Problem List    Diagnosis Date Noted     Mixed hyperlipidemia 05/23/2003     Priority: High     Erectile dysfunction, unspecified erectile dysfunction type 09/02/2016     Priority: Medium     Right inguinal hernia 02/29/2016     Priority: Medium     Found in ED 2/16, CT proven       Allergic rhinitis 03/11/2014     Priority: Medium     Uses zyrtec         Family history of cardiovascular disease 05/18/2012     Priority: Medium     Primary osteoarthritis of right knee 09/07/2005     Priority: Medium     ACP (advance care planning) 12/05/2012     Priority: Low      Advance Care Planning 9/2/2016: ACP Review of Chart / Resources Provided:  Reviewed chart for advance care plan.  Kristopher Gomez has   Added by [facility] 12/05/2012     Priority: Low     State Tier Level:  Tier 1  Status:  n/a  Care Coordinator:   See Letters for [facility] Care Plan              Past Medical History:   Diagnosis Date     MIXED HYPERLIPIDEMIA 5/23/2003     Other internal derangement of knee(718.89) 1980    right medial meniscus tear s/p arthroscopy, debridement  "    Past Surgical History:   Procedure Laterality Date     HC FEMUR/KNEE SURG UNLISTED Right 1980    debridement of meniscal tear     HC VASECTOMY UNILAT/BILAT W POSTOP SEMEN      Vasectomy     Current Outpatient Prescriptions   Medication Sig Dispense Refill     celecoxib (CELEBREX) 200 MG capsule Take 1 capsule (200 mg) by mouth 2 times daily as needed for moderate pain 60 capsule 1     simvastatin (ZOCOR) 40 MG tablet Take 1 tablet (40 mg) by mouth daily 90 tablet 3     Acetaminophen (TYLENOL PO) Take 500 mg by mouth       aspirin 81 MG tablet Take 81 mg by mouth 2 times daily       Cholecalciferol (VITAMIN D-3 PO)        Calcium Carbonate (CALCIUM 600 PO) Take 1,800 mg by mouth       fluticasone (FLONASE) 50 MCG/ACT nasal spray Spray 1-2 sprays into both nostrils daily 16 g 1     OTC products: no recent use of  NSAIDS or Steroids. Will stop LD ASA 5 days prior to surgery.    Allergies   Allergen Reactions     No Known Allergies       Latex Allergy: NO    Social History   Substance Use Topics     Smoking status: Never Smoker     Smokeless tobacco: Never Used     Alcohol use 1.0 oz/week     2 Standard drinks or equivalent per week     History   Drug Use No       REVIEW OF SYSTEMS:                                                    Constitutional, neuro, ENT, endocrine, pulmonary, cardiac, gastrointestinal, genitourinary, musculoskeletal, integument and psychiatric systems are negative, except as otherwise noted.      EXAM:                                                    /80 (BP Location: Left arm, Patient Position: Chair, Cuff Size: Adult Large)  Pulse 72  Temp 97  F (36.1  C) (Oral)  Resp 20  Ht 1.905 m (6' 3\")  Wt 115.8 kg (255 lb 3.2 oz)  BMI 31.9 kg/m2    GENERAL APPEARANCE: healthy, alert and no distress     EYES: EOMI, PERRL     HENT: ear canals and TM's normal and nose and mouth without ulcers or lesions     NECK: no adenopathy, no asymmetry, masses, or scars and thyroid normal to palpation   "   RESP: lungs clear to auscultation - no rales, rhonchi or wheezes     CV: regular rates and rhythm, normal S1 S2, no S3 or S4 and no murmur, click or rub     ABDOMEN:  soft, nontender, no HSM or masses and bowel sounds normal     MS: extremities normal- no gross deformities noted, no evidence of inflammation in joints, FROM in all extremities.     SKIN: no suspicious lesions or rashes     NEURO: Normal strength and tone, sensory exam grossly normal, mentation intact and speech normal     PSYCH: mentation appears normal. and affect normal/bright     LYMPHATICS: No axillary, cervical, or supraclavicular nodes    DIAGNOSTICS:                                                    EKG 3/31/2017: sinus bradycardia, normal axis, normal intervals, no acute ST/T changes c/w ischemia, no LVH by voltage criteria, unchanged from previous tracings  Hemoglobin (indicated for history of anemia or procedure with significant blood loss such as tonsillectomy, major intraperitoneal surgery, vascular surgery, major spine surgery, total joint replacement)  Serum Potassium  Serum Creatinine    Office Visit on 10/06/2017   Component Date Value Ref Range Status     WBC 10/06/2017 5.0  4.0 - 11 10*9/L Final     RBC Count 10/06/2017 5.81  4.4 - 5.9 10*12/L Final     Hemoglobin 10/06/2017 15.9  13.3 - 17.7 g/dL Final     Hematocrit 10/06/2017 49.8  40.0 - 53.0 % Final     MCV 10/06/2017 85.7  78 - 100 fL Final     MCH 10/06/2017 27.4  26 - 33 pg Final     MCHC 10/06/2017 31.9  31 - 36 g/dL Final     RDW 10/06/2017 14.8  % Final     Platelet Count 10/06/2017 301  150 - 375 10^9/L Final     Glucose 10/06/2017 102* 65 - 99 mg/dL Final    Comment: For someone without known diabetes, a glucose value  between 100 and 125 mg/dL is consistent with  prediabetes and should be confirmed with a  follow-up test.                   Fasting reference interval          Urea Nitrogen 10/06/2017 15  7 - 25 mg/dL Final     Creatinine 10/06/2017 1.13  0.70 - 1.33  mg/dL Final    Comment: For patients >49 years of age, the reference limit  for Creatinine is approximately 13% higher for people  identified as -American.          GFR Estimate 10/06/2017 73  > OR = 60 mL/min/1.73m2 Final     EGFR African American 10/06/2017 84  > OR = 60 mL/min/1.73m2 Final     BUN/Creatinine Ratio 10/06/2017 NOT APPLICABLE  6 - 22 (calc) Final     Sodium 10/06/2017 139  135 - 146 mmol/L Final     Potassium 10/06/2017 4.5  3.5 - 5.3 mmol/L Final     Chloride 10/06/2017 104  98 - 110 mmol/L Final     Carbon Dioxide 10/06/2017 25  20 - 31 mmol/L Final     Calcium 10/06/2017 9.3  8.6 - 10.3 mg/dL Final     Protein Total 10/06/2017 7.3  6.1 - 8.1 g/dL Final     Albumin 10/06/2017 4.4  3.6 - 5.1 g/dL Final     Globulin Calculated 10/06/2017 2.9  1.9 - 3.7 g/dL (calc) Final     A/G Ratio 10/06/2017 1.5  1.0 - 2.5 (calc) Final     Bilirubin Total 10/06/2017 1.4* 0.2 - 1.2 mg/dL Final     Alkaline Phosphatase 10/06/2017 82  40 - 115 U/L Final     AST 10/06/2017 17  10 - 35 U/L Final     ALT 10/06/2017 8* 9 - 46 U/L Final     Cholesterol 10/06/2017 187  <200 mg/dL Final     HDL Cholesterol 10/06/2017 61  >40 mg/dL Final     Triglycerides 10/06/2017 75  <150 mg/dL Final     LDL Cholesterol Calculated 10/06/2017 110* mg/dL (calc) Final    Comment: Reference range: <100     Desirable range <100 mg/dL for patients with CHD or  diabetes and <70 mg/dL for diabetic patients with  known heart disease.     LDL-C is now calculated using the Yolie   calculation, which is a validated novel method providing   better accuracy than the Friedewald equation in the   estimation of LDL-C.   Karthik NG et al. JONATHAN. 2013;310(19): 3567-3276   (http://education.[employer].com/faq/WDN547)       Cholesterol/HDL Ratio 10/06/2017 3.1  <5.0 (calc) Final     Non HDL Cholesterol 10/06/2017 126  <130 mg/dL (calc) Final    Comment: For patients with diabetes plus 1 major ASCVD risk   factor, treating to a non-HDL-C  goal of <100 mg/dL   (LDL-C of <70 mg/dL) is considered a therapeutic   option.         No concerns with lab results.      IMPRESSION:                                                    Reason for surgery/procedure: Inguinal hernia repair  Diagnosis/reason for consult: Pre-operative exmaination    The proposed surgical procedure is considered INTERMEDIATE risk.    REVISED CARDIAC RISK INDEX  The patient has the following serious cardiovascular risks for perioperative complications such as (MI, PE, VFib and 3  AV Block):  No serious cardiac risks  INTERPRETATION: 1 risks: Class II (low risk - 0.9% complication rate)    The patient has the following additional risks for perioperative complications:  No identified additional risks      ICD-10-CM    1. Pre-op exam Z01.818 VENOUS COLLECTION     HEMOGRAM/PLATELET ([facility])     COMPREHENSIVE METABOLIC PANEL ([facility]) XCMP   2. Mixed hyperlipidemia E78.2 Lipid Profile ([facility])     simvastatin (ZOCOR) 40 MG tablet   3. Need for vaccination Z23 HC FLU VAC PRESRV FREE QUAD SPLIT VIR 3+YRS IM     VACCINE ADMINISTRATION, INITIAL       RECOMMENDATIONS:                                                      --Patient is to take all scheduled medications on the day of surgery EXCEPT for modifications listed below.    APPROVAL GIVEN to proceed with proposed procedure, without further diagnostic evaluation    1. Seven days before surgery do not take Aspirin or any over-the-counter pain medications other than Tylenol.  TYLENOL is the safest pain pill to use before surgery because it does not affect your bleeding time. If tylenol is not sufficient for pain control talk to me or the surgeon and we will decide what is safe to use.    2. Do not eat anything after midnight  (pam of the surgery) and nothing the morning of the surgery.    3. Medications: Take all medications as normal on day prior to surgery, but hold on day of surgery. Contact surgery team regarding Celebrex and whether on not it  should be held prior to this procedure.    4. Follow all instructions given by the surgery team. They usually give out a packet. Read it and please follow it precisely. This helps surgical experience and outcomes.    5. If you have any questions do not hesitate to call me or the surgeon/surgical team.      Miesha Lyons PA-C  [facility]           Signed Electronically by: Miesha Lyons PA-C    Copy of this evaluation report is provided to requesting physician.    [place] Preop Guidelines

## 2017-10-07 LAB
ALBUMIN SERPL-MCNC: 4.4 G/DL (ref 3.6–5.1)
ALBUMIN/GLOB SERPL: 1.5 (CALC) (ref 1–2.5)
ALP SERPL-CCNC: 82 U/L (ref 40–115)
ALT SERPL-CCNC: 8 U/L (ref 9–46)
AST SERPL-CCNC: 17 U/L (ref 10–35)
BILIRUB SERPL-MCNC: 1.4 MG/DL (ref 0.2–1.2)
BUN SERPL-MCNC: 15 MG/DL (ref 7–25)
BUN/CREATININE RATIO: ABNORMAL (CALC) (ref 6–22)
CALCIUM SERPL-MCNC: 9.3 MG/DL (ref 8.6–10.3)
CHLORIDE SERPLBLD-SCNC: 104 MMOL/L (ref 98–110)
CHOLEST SERPL-MCNC: 187 MG/DL
CHOLEST/HDLC SERPL: 3.1 (CALC)
CO2 SERPL-SCNC: 25 MMOL/L (ref 20–31)
CREAT SERPL-MCNC: 1.13 MG/DL (ref 0.7–1.33)
EGFR AFRICAN AMERICAN - QUEST: 84 ML/MIN/1.73M2
GFR SERPL CREATININE-BSD FRML MDRD: 73 ML/MIN/1.73M2
GLOBULIN, CALCULATED - QUEST: 2.9 G/DL (CALC) (ref 1.9–3.7)
GLUCOSE - QUEST: 102 MG/DL (ref 65–99)
HDLC SERPL-MCNC: 61 MG/DL
LDLC SERPL CALC-MCNC: 110 MG/DL (CALC)
NONHDLC SERPL-MCNC: 126 MG/DL (CALC)
POTASSIUM SERPL-SCNC: 4.5 MMOL/L (ref 3.5–5.3)
PROT SERPL-MCNC: 7.3 G/DL (ref 6.1–8.1)
SODIUM SERPL-SCNC: 139 MMOL/L (ref 135–146)
TRIGL SERPL-MCNC: 75 MG/DL

## 2017-11-02 NOTE — H&P (VIEW-ONLY)
[facility]  [address]  [address]  [facility] [address]  [phone]  Dept: [phone]    PRE-OP EVALUATION:  Today's date: 10/6/2017    Kristopher Gomez (: 1960) presents for pre-operative evaluation assessment as requested by Dr. Bearden.  He requires evaluation and anesthesia risk assessment prior to undergoing surgery/procedure for treatment of inguinal hernia .  Proposed procedure: herniorrhaphy inguinal    Date of Surgery/ Procedure: 2017  Time of Surgery/ Procedure: AM  Hospital/Surgical Facility: [facility]  Fax number for surgical facility: Not needed  Primary Physician: Talha Licona  Type of Anesthesia Anticipated: General    Patient has a Health Care Directive or Living Will:  YES     1. NO - Do you have a history of heart attack, stroke, stent, bypass or surgery on an artery in the head, neck, heart or legs?  2. NO - Do you ever have any pain or discomfort in your chest?  3. NO - Do you have a history of  Heart Failure?  4. NO - Are you troubled by shortness of breath when: walking on the level, up a slight hill or at night?  5. NO - Do you currently have a cold, bronchitis or other respiratory infection?  6. NO - Do you have a cough, shortness of breath or wheezing?  7. NO - Do you sometimes get pains in the calves of your legs when you walk?  8. NO - Do you or anyone in your family have previous history of blood clots?  9. NO - Do you or does anyone in your family have a serious bleeding problem such as prolonged bleeding following surgeries or cuts?  10. NO - Have you ever had problems with anemia or been told to take iron pills?  11. NO - Have you had any abnormal blood loss such as black, tarry or bloody stools, or abnormal vaginal bleeding?  12. NO - Have you ever had a blood transfusion?  13. NO - Have you or any of your relatives ever had problems with anesthesia?  14. NO - Do you have sleep apnea, excessive snoring or  daytime drowsiness?  15. NO - Do you have any prosthetic heart valves?  16. YES - DO YOU HAVE PROSTHETIC JOINTS? Right knee replacement 4/2017  17. NO - Is there any chance that you may be pregnant?        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 Overall not painful, but does get tender while doing sit-ups. Takes LD ASA- will stop 5 days before procedure.      See problem list for active medical problems.  Problems all longstanding and stable, except as noted/documented.  See ROS for pertinent symptoms related to these conditions.                                                                                                  .    MEDICAL HISTORY:                                                    Patient Active Problem List    Diagnosis Date Noted     Mixed hyperlipidemia 05/23/2003     Priority: High     Erectile dysfunction, unspecified erectile dysfunction type 09/02/2016     Priority: Medium     Right inguinal hernia 02/29/2016     Priority: Medium     Found in ED 2/16, CT proven       Allergic rhinitis 03/11/2014     Priority: Medium     Uses zyrtec         Family history of cardiovascular disease 05/18/2012     Priority: Medium     Primary osteoarthritis of right knee 09/07/2005     Priority: Medium     ACP (advance care planning) 12/05/2012     Priority: Low      Advance Care Planning 9/2/2016: ACP Review of Chart / Resources Provided:  Reviewed chart for advance care plan.  Kristopher Gomez has   Added by [facility] 12/05/2012     Priority: Low     State Tier Level:  Tier 1  Status:  n/a  Care Coordinator:   See Letters for [facility] Care Plan              Past Medical History:   Diagnosis Date     MIXED HYPERLIPIDEMIA 5/23/2003     Other internal derangement of knee(718.89) 1980    right medial meniscus tear s/p arthroscopy, debridement  "    Past Surgical History:   Procedure Laterality Date     HC FEMUR/KNEE SURG UNLISTED Right 1980    debridement of meniscal tear     HC VASECTOMY UNILAT/BILAT W POSTOP SEMEN      Vasectomy     Current Outpatient Prescriptions   Medication Sig Dispense Refill     celecoxib (CELEBREX) 200 MG capsule Take 1 capsule (200 mg) by mouth 2 times daily as needed for moderate pain 60 capsule 1     simvastatin (ZOCOR) 40 MG tablet Take 1 tablet (40 mg) by mouth daily 90 tablet 3     Acetaminophen (TYLENOL PO) Take 500 mg by mouth       aspirin 81 MG tablet Take 81 mg by mouth 2 times daily       Cholecalciferol (VITAMIN D-3 PO)        Calcium Carbonate (CALCIUM 600 PO) Take 1,800 mg by mouth       fluticasone (FLONASE) 50 MCG/ACT nasal spray Spray 1-2 sprays into both nostrils daily 16 g 1     OTC products: no recent use of  NSAIDS or Steroids. Will stop LD ASA 5 days prior to surgery.    Allergies   Allergen Reactions     No Known Allergies       Latex Allergy: NO    Social History   Substance Use Topics     Smoking status: Never Smoker     Smokeless tobacco: Never Used     Alcohol use 1.0 oz/week     2 Standard drinks or equivalent per week     History   Drug Use No       REVIEW OF SYSTEMS:                                                    Constitutional, neuro, ENT, endocrine, pulmonary, cardiac, gastrointestinal, genitourinary, musculoskeletal, integument and psychiatric systems are negative, except as otherwise noted.      EXAM:                                                    /80 (BP Location: Left arm, Patient Position: Chair, Cuff Size: Adult Large)  Pulse 72  Temp 97  F (36.1  C) (Oral)  Resp 20  Ht 1.905 m (6' 3\")  Wt 115.8 kg (255 lb 3.2 oz)  BMI 31.9 kg/m2    GENERAL APPEARANCE: healthy, alert and no distress     EYES: EOMI, PERRL     HENT: ear canals and TM's normal and nose and mouth without ulcers or lesions     NECK: no adenopathy, no asymmetry, masses, or scars and thyroid normal to palpation   "   RESP: lungs clear to auscultation - no rales, rhonchi or wheezes     CV: regular rates and rhythm, normal S1 S2, no S3 or S4 and no murmur, click or rub     ABDOMEN:  soft, nontender, no HSM or masses and bowel sounds normal     MS: extremities normal- no gross deformities noted, no evidence of inflammation in joints, FROM in all extremities.     SKIN: no suspicious lesions or rashes     NEURO: Normal strength and tone, sensory exam grossly normal, mentation intact and speech normal     PSYCH: mentation appears normal. and affect normal/bright     LYMPHATICS: No axillary, cervical, or supraclavicular nodes    DIAGNOSTICS:                                                    EKG 3/31/2017: sinus bradycardia, normal axis, normal intervals, no acute ST/T changes c/w ischemia, no LVH by voltage criteria, unchanged from previous tracings  Hemoglobin (indicated for history of anemia or procedure with significant blood loss such as tonsillectomy, major intraperitoneal surgery, vascular surgery, major spine surgery, total joint replacement)  Serum Potassium  Serum Creatinine    Office Visit on 10/06/2017   Component Date Value Ref Range Status     WBC 10/06/2017 5.0  4.0 - 11 10*9/L Final     RBC Count 10/06/2017 5.81  4.4 - 5.9 10*12/L Final     Hemoglobin 10/06/2017 15.9  13.3 - 17.7 g/dL Final     Hematocrit 10/06/2017 49.8  40.0 - 53.0 % Final     MCV 10/06/2017 85.7  78 - 100 fL Final     MCH 10/06/2017 27.4  26 - 33 pg Final     MCHC 10/06/2017 31.9  31 - 36 g/dL Final     RDW 10/06/2017 14.8  % Final     Platelet Count 10/06/2017 301  150 - 375 10^9/L Final     Glucose 10/06/2017 102* 65 - 99 mg/dL Final    Comment: For someone without known diabetes, a glucose value  between 100 and 125 mg/dL is consistent with  prediabetes and should be confirmed with a  follow-up test.                   Fasting reference interval          Urea Nitrogen 10/06/2017 15  7 - 25 mg/dL Final     Creatinine 10/06/2017 1.13  0.70 - 1.33  mg/dL Final    Comment: For patients >49 years of age, the reference limit  for Creatinine is approximately 13% higher for people  identified as -American.          GFR Estimate 10/06/2017 73  > OR = 60 mL/min/1.73m2 Final     EGFR African American 10/06/2017 84  > OR = 60 mL/min/1.73m2 Final     BUN/Creatinine Ratio 10/06/2017 NOT APPLICABLE  6 - 22 (calc) Final     Sodium 10/06/2017 139  135 - 146 mmol/L Final     Potassium 10/06/2017 4.5  3.5 - 5.3 mmol/L Final     Chloride 10/06/2017 104  98 - 110 mmol/L Final     Carbon Dioxide 10/06/2017 25  20 - 31 mmol/L Final     Calcium 10/06/2017 9.3  8.6 - 10.3 mg/dL Final     Protein Total 10/06/2017 7.3  6.1 - 8.1 g/dL Final     Albumin 10/06/2017 4.4  3.6 - 5.1 g/dL Final     Globulin Calculated 10/06/2017 2.9  1.9 - 3.7 g/dL (calc) Final     A/G Ratio 10/06/2017 1.5  1.0 - 2.5 (calc) Final     Bilirubin Total 10/06/2017 1.4* 0.2 - 1.2 mg/dL Final     Alkaline Phosphatase 10/06/2017 82  40 - 115 U/L Final     AST 10/06/2017 17  10 - 35 U/L Final     ALT 10/06/2017 8* 9 - 46 U/L Final     Cholesterol 10/06/2017 187  <200 mg/dL Final     HDL Cholesterol 10/06/2017 61  >40 mg/dL Final     Triglycerides 10/06/2017 75  <150 mg/dL Final     LDL Cholesterol Calculated 10/06/2017 110* mg/dL (calc) Final    Comment: Reference range: <100     Desirable range <100 mg/dL for patients with CHD or  diabetes and <70 mg/dL for diabetic patients with  known heart disease.     LDL-C is now calculated using the Yolie   calculation, which is a validated novel method providing   better accuracy than the Friedewald equation in the   estimation of LDL-C.   Karthik NG et al. JONATHAN. 2013;310(19): 5912-6997   (http://education.[employer].com/faq/HCR046)       Cholesterol/HDL Ratio 10/06/2017 3.1  <5.0 (calc) Final     Non HDL Cholesterol 10/06/2017 126  <130 mg/dL (calc) Final    Comment: For patients with diabetes plus 1 major ASCVD risk   factor, treating to a non-HDL-C  goal of <100 mg/dL   (LDL-C of <70 mg/dL) is considered a therapeutic   option.         No concerns with lab results.      IMPRESSION:                                                    Reason for surgery/procedure: Inguinal hernia repair  Diagnosis/reason for consult: Pre-operative exmaination    The proposed surgical procedure is considered INTERMEDIATE risk.    REVISED CARDIAC RISK INDEX  The patient has the following serious cardiovascular risks for perioperative complications such as (MI, PE, VFib and 3  AV Block):  No serious cardiac risks  INTERPRETATION: 1 risks: Class II (low risk - 0.9% complication rate)    The patient has the following additional risks for perioperative complications:  No identified additional risks      ICD-10-CM    1. Pre-op exam Z01.818 VENOUS COLLECTION     HEMOGRAM/PLATELET ([facility])     COMPREHENSIVE METABOLIC PANEL ([facility]) XCMP   2. Mixed hyperlipidemia E78.2 Lipid Profile ([facility])     simvastatin (ZOCOR) 40 MG tablet   3. Need for vaccination Z23 HC FLU VAC PRESRV FREE QUAD SPLIT VIR 3+YRS IM     VACCINE ADMINISTRATION, INITIAL       RECOMMENDATIONS:                                                      --Patient is to take all scheduled medications on the day of surgery EXCEPT for modifications listed below.    APPROVAL GIVEN to proceed with proposed procedure, without further diagnostic evaluation    1. Seven days before surgery do not take Aspirin or any over-the-counter pain medications other than Tylenol.  TYLENOL is the safest pain pill to use before surgery because it does not affect your bleeding time. If tylenol is not sufficient for pain control talk to me or the surgeon and we will decide what is safe to use.    2. Do not eat anything after midnight  (pam of the surgery) and nothing the morning of the surgery.    3. Medications: Take all medications as normal on day prior to surgery, but hold on day of surgery. Contact surgery team regarding Celebrex and whether on not it  should be held prior to this procedure.    4. Follow all instructions given by the surgery team. They usually give out a packet. Read it and please follow it precisely. This helps surgical experience and outcomes.    5. If you have any questions do not hesitate to call me or the surgeon/surgical team.      Miesha Lyons PA-C  [facility]           Signed Electronically by: Miesha Lyons PA-C    Copy of this evaluation report is provided to requesting physician.    [place] Preop Guidelines

## 2017-11-03 ENCOUNTER — HOSPITAL ENCOUNTER (OUTPATIENT)
Facility: CLINIC | Age: 57
Discharge: HOME | End: 2017-11-03
Attending: SURGERY | Admitting: SURGERY
Payer: COMMERCIAL

## 2017-11-03 ENCOUNTER — ANESTHESIA (OUTPATIENT)
Dept: SURGERY | Facility: CLINIC | Age: 57
End: 2017-11-03
Payer: COMMERCIAL

## 2017-11-03 ENCOUNTER — APPOINTMENT (OUTPATIENT)
Dept: SURGERY | Facility: PHYSICIAN GROUP | Age: 57
End: 2017-11-03
Payer: COMMERCIAL

## 2017-11-03 ENCOUNTER — ANESTHESIA EVENT (OUTPATIENT)
Dept: SURGERY | Facility: CLINIC | Age: 57
End: 2017-11-03
Payer: COMMERCIAL

## 2017-11-03 ENCOUNTER — SURGERY (OUTPATIENT)
Age: 57
End: 2017-11-03

## 2017-11-03 VITALS
WEIGHT: 262 LBS | BODY MASS INDEX: 32.58 KG/M2 | DIASTOLIC BLOOD PRESSURE: 87 MMHG | HEART RATE: 61 BPM | RESPIRATION RATE: 18 BRPM | SYSTOLIC BLOOD PRESSURE: 139 MMHG | HEIGHT: 75 IN | TEMPERATURE: 98.7 F | OXYGEN SATURATION: 98 %

## 2017-11-03 DIAGNOSIS — K40.90 RIGHT INGUINAL HERNIA: Primary | ICD-10-CM

## 2017-11-03 PROCEDURE — C1781 MESH (IMPLANTABLE): HCPCS | Performed by: SURGERY

## 2017-11-03 PROCEDURE — 25000128 H RX IP 250 OP 636: Performed by: ANESTHESIOLOGY

## 2017-11-03 PROCEDURE — 25000125 ZZHC RX 250: Performed by: SURGERY

## 2017-11-03 PROCEDURE — 49505 PRP I/HERN INIT REDUC >5 YR: CPT | Mod: RT | Performed by: SURGERY

## 2017-11-03 PROCEDURE — 49505 PRP I/HERN INIT REDUC >5 YR: CPT | Mod: AS | Performed by: PHYSICIAN ASSISTANT

## 2017-11-03 PROCEDURE — 37000009 ZZH ANESTHESIA TECHNICAL FEE, EACH ADDTL 15 MIN: Performed by: SURGERY

## 2017-11-03 PROCEDURE — 36000050 ZZH SURGERY LEVEL 2 1ST 30 MIN: Performed by: SURGERY

## 2017-11-03 PROCEDURE — 37000008 ZZH ANESTHESIA TECHNICAL FEE, 1ST 30 MIN: Performed by: SURGERY

## 2017-11-03 PROCEDURE — 25000125 ZZHC RX 250: Performed by: NURSE ANESTHETIST, CERTIFIED REGISTERED

## 2017-11-03 PROCEDURE — 25000128 H RX IP 250 OP 636: Performed by: NURSE ANESTHETIST, CERTIFIED REGISTERED

## 2017-11-03 PROCEDURE — 71000027 ZZH RECOVERY PHASE 2 EACH 15 MINS: Performed by: SURGERY

## 2017-11-03 PROCEDURE — 40000306 ZZH STATISTIC PRE PROC ASSESS II: Performed by: SURGERY

## 2017-11-03 PROCEDURE — 25000566 ZZH SEVOFLURANE, EA 15 MIN: Performed by: SURGERY

## 2017-11-03 PROCEDURE — 36000052 ZZH SURGERY LEVEL 2 EA 15 ADDTL MIN: Performed by: SURGERY

## 2017-11-03 PROCEDURE — 71000012 ZZH RECOVERY PHASE 1 LEVEL 1 FIRST HR: Performed by: SURGERY

## 2017-11-03 PROCEDURE — 25000128 H RX IP 250 OP 636: Performed by: SURGERY

## 2017-11-03 PROCEDURE — 27210794 ZZH OR GENERAL SUPPLY STERILE: Performed by: SURGERY

## 2017-11-03 DEVICE — MESH ULTRAPRO HERNIA 2.4X4.7" LARGE UHSL: Type: IMPLANTABLE DEVICE | Site: INGUINAL | Status: FUNCTIONAL

## 2017-11-03 RX ORDER — FENTANYL CITRATE 50 UG/ML
25-50 INJECTION, SOLUTION INTRAMUSCULAR; INTRAVENOUS
Status: DISCONTINUED | OUTPATIENT
Start: 2017-11-03 | End: 2017-11-03 | Stop reason: HOSPADM

## 2017-11-03 RX ORDER — ONDANSETRON 2 MG/ML
INJECTION INTRAMUSCULAR; INTRAVENOUS PRN
Status: DISCONTINUED | OUTPATIENT
Start: 2017-11-03 | End: 2017-11-03

## 2017-11-03 RX ORDER — CEFAZOLIN SODIUM 1 G/3ML
1 INJECTION, POWDER, FOR SOLUTION INTRAMUSCULAR; INTRAVENOUS SEE ADMIN INSTRUCTIONS
Status: DISCONTINUED | OUTPATIENT
Start: 2017-11-03 | End: 2017-11-03 | Stop reason: HOSPADM

## 2017-11-03 RX ORDER — HYDROMORPHONE HYDROCHLORIDE 1 MG/ML
.3-.5 INJECTION, SOLUTION INTRAMUSCULAR; INTRAVENOUS; SUBCUTANEOUS EVERY 10 MIN PRN
Status: DISCONTINUED | OUTPATIENT
Start: 2017-11-03 | End: 2017-11-03 | Stop reason: HOSPADM

## 2017-11-03 RX ORDER — PROPOFOL 10 MG/ML
INJECTION, EMULSION INTRAVENOUS PRN
Status: DISCONTINUED | OUTPATIENT
Start: 2017-11-03 | End: 2017-11-03

## 2017-11-03 RX ORDER — LIDOCAINE 40 MG/G
CREAM TOPICAL
Status: DISCONTINUED | OUTPATIENT
Start: 2017-11-03 | End: 2017-11-03 | Stop reason: HOSPADM

## 2017-11-03 RX ORDER — MEPERIDINE HYDROCHLORIDE 25 MG/ML
12.5 INJECTION INTRAMUSCULAR; INTRAVENOUS; SUBCUTANEOUS
Status: DISCONTINUED | OUTPATIENT
Start: 2017-11-03 | End: 2017-11-03 | Stop reason: HOSPADM

## 2017-11-03 RX ORDER — OXYCODONE HYDROCHLORIDE 5 MG/1
5-10 TABLET ORAL
Qty: 30 TABLET | Refills: 0 | Status: SHIPPED | OUTPATIENT
Start: 2017-11-03 | End: 2019-12-13

## 2017-11-03 RX ORDER — BUPIVACAINE HYDROCHLORIDE AND EPINEPHRINE 2.5; 5 MG/ML; UG/ML
INJECTION, SOLUTION EPIDURAL; INFILTRATION; INTRACAUDAL; PERINEURAL PRN
Status: DISCONTINUED | OUTPATIENT
Start: 2017-11-03 | End: 2017-11-03 | Stop reason: HOSPADM

## 2017-11-03 RX ORDER — LIDOCAINE HYDROCHLORIDE 10 MG/ML
INJECTION, SOLUTION INFILTRATION; PERINEURAL PRN
Status: DISCONTINUED | OUTPATIENT
Start: 2017-11-03 | End: 2017-11-03

## 2017-11-03 RX ORDER — CEFAZOLIN SODIUM 2 G/100ML
2 INJECTION, SOLUTION INTRAVENOUS
Status: COMPLETED | OUTPATIENT
Start: 2017-11-03 | End: 2017-11-03

## 2017-11-03 RX ORDER — EPHEDRINE SULFATE 50 MG/ML
INJECTION, SOLUTION INTRAVENOUS PRN
Status: DISCONTINUED | OUTPATIENT
Start: 2017-11-03 | End: 2017-11-03

## 2017-11-03 RX ORDER — OXYCODONE HYDROCHLORIDE 5 MG/1
5-10 TABLET ORAL
Status: DISCONTINUED | OUTPATIENT
Start: 2017-11-03 | End: 2017-11-03 | Stop reason: HOSPADM

## 2017-11-03 RX ORDER — NALOXONE HYDROCHLORIDE 0.4 MG/ML
.1-.4 INJECTION, SOLUTION INTRAMUSCULAR; INTRAVENOUS; SUBCUTANEOUS
Status: DISCONTINUED | OUTPATIENT
Start: 2017-11-03 | End: 2017-11-03 | Stop reason: HOSPADM

## 2017-11-03 RX ORDER — LABETALOL HYDROCHLORIDE 5 MG/ML
10 INJECTION, SOLUTION INTRAVENOUS EVERY 5 MIN PRN
Status: DISCONTINUED | OUTPATIENT
Start: 2017-11-03 | End: 2017-11-03 | Stop reason: HOSPADM

## 2017-11-03 RX ORDER — SODIUM CHLORIDE, SODIUM LACTATE, POTASSIUM CHLORIDE, CALCIUM CHLORIDE 600; 310; 30; 20 MG/100ML; MG/100ML; MG/100ML; MG/100ML
INJECTION, SOLUTION INTRAVENOUS CONTINUOUS
Status: DISCONTINUED | OUTPATIENT
Start: 2017-11-03 | End: 2017-11-03 | Stop reason: HOSPADM

## 2017-11-03 RX ORDER — DEXAMETHASONE SODIUM PHOSPHATE 4 MG/ML
INJECTION, SOLUTION INTRA-ARTICULAR; INTRALESIONAL; INTRAMUSCULAR; INTRAVENOUS; SOFT TISSUE PRN
Status: DISCONTINUED | OUTPATIENT
Start: 2017-11-03 | End: 2017-11-03

## 2017-11-03 RX ORDER — ONDANSETRON 4 MG/1
4 TABLET, ORALLY DISINTEGRATING ORAL EVERY 30 MIN PRN
Status: DISCONTINUED | OUTPATIENT
Start: 2017-11-03 | End: 2017-11-03 | Stop reason: HOSPADM

## 2017-11-03 RX ORDER — ONDANSETRON 2 MG/ML
4 INJECTION INTRAMUSCULAR; INTRAVENOUS EVERY 30 MIN PRN
Status: DISCONTINUED | OUTPATIENT
Start: 2017-11-03 | End: 2017-11-03 | Stop reason: HOSPADM

## 2017-11-03 RX ORDER — GLYCOPYRROLATE 0.2 MG/ML
INJECTION, SOLUTION INTRAMUSCULAR; INTRAVENOUS PRN
Status: DISCONTINUED | OUTPATIENT
Start: 2017-11-03 | End: 2017-11-03

## 2017-11-03 RX ORDER — FENTANYL CITRATE 50 UG/ML
INJECTION, SOLUTION INTRAMUSCULAR; INTRAVENOUS PRN
Status: DISCONTINUED | OUTPATIENT
Start: 2017-11-03 | End: 2017-11-03

## 2017-11-03 RX ORDER — GLYCINE 1.5 G/100ML
SOLUTION IRRIGATION PRN
Status: DISCONTINUED | OUTPATIENT
Start: 2017-11-03 | End: 2017-11-03 | Stop reason: HOSPADM

## 2017-11-03 RX ADMIN — MIDAZOLAM HYDROCHLORIDE 2 MG: 1 INJECTION, SOLUTION INTRAMUSCULAR; INTRAVENOUS at 07:28

## 2017-11-03 RX ADMIN — EPHEDRINE SULFATE 5 MG: 50 INJECTION, SOLUTION INTRAVENOUS at 07:56

## 2017-11-03 RX ADMIN — PROPOFOL 200 MG: 10 INJECTION, EMULSION INTRAVENOUS at 07:32

## 2017-11-03 RX ADMIN — SODIUM CHLORIDE, POTASSIUM CHLORIDE, SODIUM LACTATE AND CALCIUM CHLORIDE: 600; 310; 30; 20 INJECTION, SOLUTION INTRAVENOUS at 08:22

## 2017-11-03 RX ADMIN — EPHEDRINE SULFATE 5 MG: 50 INJECTION, SOLUTION INTRAVENOUS at 07:41

## 2017-11-03 RX ADMIN — EPHEDRINE SULFATE 5 MG: 50 INJECTION, SOLUTION INTRAVENOUS at 07:49

## 2017-11-03 RX ADMIN — CEFAZOLIN SODIUM 2 G: 2 INJECTION, SOLUTION INTRAVENOUS at 07:37

## 2017-11-03 RX ADMIN — GLYCOPYRROLATE 0.2 MG: 0.2 INJECTION, SOLUTION INTRAMUSCULAR; INTRAVENOUS at 07:46

## 2017-11-03 RX ADMIN — SODIUM CHLORIDE, POTASSIUM CHLORIDE, SODIUM LACTATE AND CALCIUM CHLORIDE: 600; 310; 30; 20 INJECTION, SOLUTION INTRAVENOUS at 06:54

## 2017-11-03 RX ADMIN — BUPIVACAINE HYDROCHLORIDE AND EPINEPHRINE BITARTRATE 30 ML: 2.5; .0091 INJECTION, SOLUTION EPIDURAL; INFILTRATION; INTRACAUDAL; PERINEURAL at 08:38

## 2017-11-03 RX ADMIN — LIDOCAINE HYDROCHLORIDE 50 MG: 10 INJECTION, SOLUTION INFILTRATION; PERINEURAL at 07:32

## 2017-11-03 RX ADMIN — EPHEDRINE SULFATE 5 MG: 50 INJECTION, SOLUTION INTRAVENOUS at 08:08

## 2017-11-03 RX ADMIN — FENTANYL CITRATE 150 MCG: 50 INJECTION, SOLUTION INTRAMUSCULAR; INTRAVENOUS at 07:44

## 2017-11-03 RX ADMIN — GLYCINE 200 ML: 1.5 SOLUTION IRRIGATION at 08:39

## 2017-11-03 RX ADMIN — DEXAMETHASONE SODIUM PHOSPHATE 4 MG: 4 INJECTION, SOLUTION INTRA-ARTICULAR; INTRALESIONAL; INTRAMUSCULAR; INTRAVENOUS; SOFT TISSUE at 07:33

## 2017-11-03 RX ADMIN — FENTANYL CITRATE 100 MCG: 50 INJECTION, SOLUTION INTRAMUSCULAR; INTRAVENOUS at 07:32

## 2017-11-03 RX ADMIN — ONDANSETRON 4 MG: 2 INJECTION INTRAMUSCULAR; INTRAVENOUS at 08:37

## 2017-11-03 NOTE — ANESTHESIA PREPROCEDURE EVALUATION
Anesthesia Evaluation     . Pt has had prior anesthetic. Type: General    No history of anesthetic complications          ROS/MED HX    ENT/Pulmonary:  - neg pulmonary ROS     Neurologic:  - neg neurologic ROS     Cardiovascular:     (+) Dyslipidemia, ----. : . . . :. .       METS/Exercise Tolerance:     Hematologic:  - neg hematologic  ROS       Musculoskeletal:  - neg musculoskeletal ROS       GI/Hepatic:     (+) hernia      Renal/Genitourinary:  - ROS Renal section negative       Endo:  - neg endo ROS       Psychiatric:  - neg psychiatric ROS       Infectious Disease:  - neg infectious disease ROS       Malignancy:      - no malignancy   Other:    - neg other ROS                 Physical Exam  Normal systems: cardiovascular, pulmonary and dental    Airway   Mallampati: I  TM distance: >3 FB  Neck ROM: full    Dental     Cardiovascular       Pulmonary                     Anesthesia Plan      History & Physical Review  History and physical reviewed and following examination; no interval change.    ASA Status:  1 .    NPO Status:  > 8 hours    Plan for General and LMA with Intravenous and Propofol induction. Maintenance will be Balanced.    PONV prophylaxis:  Ondansetron (or other 5HT-3) and Dexamethasone or Solumedrol       Postoperative Care  Postoperative pain management:  IV analgesics.      Consents  Anesthetic plan, risks, benefits and alternatives discussed with:  Patient or representative and Patient..                          .

## 2017-11-03 NOTE — IP AVS SNAPSHOT
MRN:[id number]                      After Visit Summary   11/3/2017    Kristopher Gomez    MRN: [id number]           Thank you!     Thank you for choosing [facility] for your care. Our goal is always to provide you with excellent care. Hearing back from our patients is one way we can continue to improve our services. Please take a few minutes to complete the written survey that you may receive in the mail after you visit. If you would like to speak to someone directly about your visit please contact Patient Relations at [phone]. Thank you!          Patient Information     Date Of Birth          1960        About your hospital stay     You were admitted on:  November 3, 2017 You last received care in the:  [facility]    You were discharged on:  November 3, 2017       Who to Call     For medical emergencies, please call 911.  For non-urgent questions about your medical care, please call your primary care provider or clinic, [phone]  For questions related to your surgery, please call your surgery clinic        Attending Provider     Provider Specialty    Reginaldo Bearden MD General Surgery       Primary Care Provider Office Phone # Fax #    Talha Calvin Licona [phone]      Further instructions from your care team       HOME CARE FOLLOWING INGUINAL/FEMORAL HERNIA REPAIR  CAROL Gallegos, RENATA Garcia D. Maurer, SERENA Schulz    DIET:  No restrictions.  Increased fluid intake is recommended. While taking pain medications, increase dietary fiber or add a fiber supplementation like Metamucil or Citrucel to help prevent constipation - a possible side effect of pain medications.    NAUSEA:  If nauseated from the anesthetic/pain meds; rest in bed, get up cautiously with assistance, and drink clear liquids (juice, tea, broth).    ACTIVITY:  Light Activity -- you may immediately be up and about as  tolerated.  Driving -- you may drive when comfortable and off narcotic pain medications.  Light Work -- resume when comfortable off pain medications.  (If you can drive, you probably can work.)  Strenuous Work/Activity -- limit lifting to 20 pounds for 3 weeks.  Active Sports (running, biking, etc.) -- cautiously resume after 4 weeks.    INCISIONAL CARE:    If you have a dressing in place, keep clean and dry for 48 hours; you may replace the gauze if it becomes soiled.    After 48 hours you may remove the dressing and shower.  Do not submerse incision in water for 1 week.    Sutures will absorb and need not be removed.    If present, leave the steri-strips (white paper tapes) in place for 14 days after surgery.    Expect a variable amount of swelling/black and blue discoloration that may involve the penis/scrotum or labia.    Some numbness around the incision is common.    A lump/ridge under the incision is normal and will gradually resolve.    DISCOMFORT:  Local anesthetic placed at surgery should provide relief for 4-8 hours.  Begin taking pain pills before discomfort is severe.  Take the pain medication with some food, when possible, to minimize side effects.  Intermittent use of ice packs to the hernia repair site may help during the first 1-3 weeks after surgery.  Expect gradual improvement.    Over-the-counter anti-inflammatory medications (i.e. Ibuprofen/Advil/Motrin or Naprosyn/Aleve) may be used per package instructions in addition to or while tapering off the narcotic pain medications to decrease swelling and sensitivity at the repair site.  DO NOT TAKE these Anti-inflammatory medications if your primary physician has advised against doing so, or if you have acid reflux, ulcer, or bleeding disorder, or take blood-thinner medications.  Call your primary physician or the surgery office if you have medication questions.    RETURN APPOINTMENT:  Schedule a follow-up visit 2-3 weeks post-op.  Office Phone:   [phone]     CONTACT US IF THE FOLLOWING DEVELOPS:   1. A fever that is above 101     2. If there is a large amount of drainage, bleeding, or swelling.   3. Severe pain that is not relieved by your prescription.   4. Drainage that is thick, cloudy, yellow, green or white.   5. Any other questions not answered by  Frequently Asked Questions  sheet.      FREQUENTLY ASKED QUESTIONS:    Q:  How should my incision look?    A:  Normally your incision will appear slightly swollen with light redness directly along the incision itself as it heals.  It may feel like a bump or ridge as the healing/scarring happens, and over time (3-4 months) this bump or ridge feeling should slowly go away.  In general, clear or pink watery drainage can be normal at first as your incision heals, but should decrease over time.    Q:  How do I know if my incision is infected?  A:  Look at your incision for signs of infection, like redness around the incision spreading to surrounding skin, or drainage of cloudy or foul-smelling drainage.  If you feel warm, check your temperature to see if you are running a fever.    **If any of these things occur, please notify the nurse at our office.  We may need you to come into the office for an incision check.      Q:  How do I take care of my incision?  A:  If you have a dressing in place - Starting the day after surgery, replace the dressing 1-2 times a day until there is no further drainage from the incision.  At that time, a dressing is no longer needed.  Try to minimize tape on the skin if irritation is occurring at the tape sites.  If you have significant irritation from tape on the skin, please call the office to discuss other method of dressing your incision.    Small pieces of tape called  steri-strips  may be present directly overlying your incision; these may be removed 10 days after surgery unless otherwise specified by your surgeon.  If these tapes start to loosen at the ends, you may trim  them back until they fall off or are removed.      Q:  There is a piece of tape or a sticky  lead  still on my skin.  Can I remove this?  A:  Sometimes the sticky  leads  used for monitoring during surgery or for evaluation in the emergency department are not all removed while you are in the hospital.  These sometimes have a tab or metal dot on them.  You can easily remove these on your own, like taking off a band-aid.  If there is a gel substance under the  lead , simply wipe/clean it off with a washcloth or paper towel.      Q:  What can I do to minimize constipation (very hard stools, or lack of stools)?  A:  Stay well hydrated.  Increase your dietary fiber intake or take a fiber supplement -with plenty of water.  Walk around frequently.  You may consider an over-the-counter stool-softener.  Your Pharmacist can assist you with choosing one that is stocked at your pharmacy.  Constipation is also one of the most common side effects of pain medication.  If you are using pain medication, be pro-active and try to PREVENT problems with constipation by taking the steps above BEFORE constipation becomes a problem.    Q:  What do I do if I need more pain medications?  A:  Call the office to receive refills.  Be aware that certain pain meds cannot be called into a pharmacy and actually require a paper prescription.  A change may be made in your pain med as you progress thru your recovery period or if you have side effects to certain meds.    --Pain meds are NOT refilled after 5pm on weekdays, and NOT AT ALL on the weekends, so please look ahead to prevent problems.      Q:  Why am I having a hard time sleeping now that I am at home?  A:  Many medications you receive while you are in the hospital can impact your sleep for a number of days after your surgery/hospitalization.  Decreased level of activity and naps during the day may also make sleeping at night difficult.  Try to minimize day-time naps, and get up frequently  during the day to walk around your home during your recovery time.  Sleep aides may be of some help, but are not recommended for long-term use.      Q:  I am having some back discomfort.  What should I do?  A:  This may be related to certain positioning that was required for your surgery, extended periods of time in bed, or other changes in your overall activity level.  You may try ice, heat, acetaminophen, or ibuprofen to treat this temporarily.  Note that many pain medications have acetaminophen in them and would state this on the prescription bottle.  Be sure not to exceed the maximum of 4000mg per day of acetaminophen.     **If the pain you are having does not resolve, is severe, or is a flare of back pain you have had on other occasions prior to surgery, please contact your primary physician for further recommendations or for an appointment to be examined at their office.    Q:  Why am I having headaches?  A:  Headaches can be caused by many things:  caffeine withdrawal, use of pain meds, dehydration, high blood pressure, lack of sleep, over-activity/exhaustion, flare-up of usual migraine headaches.  If you feel this is related to muscle tension (a band-like feeling around the head, or a pressure at the low-back of the head) you may try ice or heat to this area.  You may need to drink more fluids (try electrolyte drink like Gatorade), rest, or take your usual migraine medications.   **If your headaches do not resolve, worsen, are accompanied by other symptoms, or if your blood pressure is high, please call your primary physician for recommendation and/or examination.    Q:  I am unable to urinate.  What do I do?  A:  A small percentage of people can have difficulty urinating initially after surgery.  This includes being able to urinate only a very small amount at a time and feeling discomfort or pressure in the very low abdomen.  This is called  urinary retention , and is actually an urgent situation.  Proceed  to your nearest Emergency department for evaluation (not an Urgent Care Center).  Sometimes the bladder does not work correctly after certain medications you receive during surgery, or related to certain procedures.  You may need to have a catheter placed until your bladder recovers.  When planning to go to an Emergency department, it may help to call the ER to let them know you are coming in for this problem after a surgery.  This may help you get in quicker to be evaluated.  **If you have symptoms of a urinary tract infection, please contact your primary physician for the proper evaluation and treatment.          If you have other questions, please call the office Monday thru Friday between 8am and 5pm to discuss with the nurse or physician assistant.  #[phone]    There is a surgeon ON CALL on weekday evenings and over the weekend in case of urgent need only, and may be contacted at the same number.    If you are having an emergency, call 911 or proceed to your nearest emergency department.    GENERAL ANESTHESIA OR SEDATION CHILD DISCHARGE INSTRUCTIONS    YOUR CHILD SHOULD REST AND AVOID STRENUOUS PLAY FOR THE NEXT 24 HOURS.  MAKE ARRANGEMENTS TO HAVE AN ADULT STAY WITH HIM/HER FOR 24 HOURS AFTER DISCHARGE.    YOUR CHILD MAY FEEL DIZZY OR SLEEPY.  HE OR SHE SHOULD AVOID ACTIVITIES THAT REQUIRE BALANCE (RIDING A BIKE, CLIMBING STAIRS, SKATING) FOR THE NEXT 24 HOURS.    YOU MAY OFFER YOUR CHILD CLEAR LIQUIDS (APPLE JUICE, GINGER ALE, 7-UP, GATORADE, BROTH, ETC.) AND PROGRESS TO A REGULAR DIET IF NO NAUSEA (FEELS SICK TO THE STOMACH) OR VOMITING (THROWS UP) EXISTS.         YOUR CHILD MAY HAVE A DRY MOUTH, SORE THROAT, MUSCLE ACHES OR NIGHTMARES.  THESE SHOULD GO AWAY WITHIN 24 HOURS.    CALL YOUR DOCTOR FOR ANY OF THE FOLLOWING:  SIGNS OF INFECTION (FEVER, GROWING TENDERNESS AT THE SURGERY SITE, A LARGE AMOUNT OF DRAINAGE OR BLEEDING, SEVERE PAIN, FOUL-SMELLING DRAINAGE, REDNESS, SWELLING).    IT HAS BEEN OVER  "8 TO 10 HOURS SINCE SURGERY AND YOUR CHILD IS STILL NOT ABLE TO URINATE (PASS WATER) OR IS COMPLAINING ABOUT NOT BEING ABLE TO URINATE.          Pending Results     No orders found from 11/1/2017 to 11/4/2017.            Admission Information     Date & Time Provider Department Dept. Phone    11/3/2017 Reginaldo Bearden MD [facility]est[facility] [phone]      Your Vitals Were     Blood Pressure Pulse Temperature Respirations Height Weight    124/78 61 97.5  F (36.4  C) (Temporal) 14 1.905 m (6' 3\") 118.8 kg (262 lb)    Pulse Oximetry BMI (Body Mass Index)                95% 32.75 kg/m2          MyChart Information     [employer] gives you secure access to your electronic health record. If you see a primary care provider, you can also send messages to your care team and make appointments. If you have questions, please call your primary care clinic.  If you do not have a primary care provider, please call [phone] and they will assist you.        Care EveryWhere ID     This is your Care EveryWhere ID. This could be used by other organizations to access your [facility] medical records  [id number]        Equal Access to Services     GISSELLE CASTANEDA AH: Hadii kodak Lindsey, wakrystinda vivienne, qaybta kaalmada konrad, claribel elliott. So [facility] [phone].    ATENCIÓN: Si habla español, tiene a easton disposición servicios gratuitos de asistencia lingüística. Llame al [phone].    We comply with applicable federal civil rights laws and [place] laws. We do not discriminate on the basis of race, color, national origin, age, disability, sex, sexual orientation, or gender identity.               Review of your medicines      START taking        Dose / Directions    oxyCODONE IR 5 MG tablet   Commonly known as:  ROXICODONE   Used for:  Right inguinal hernia        Dose:  5-10 mg   Take 1-2 tablets (5-10 mg) by mouth every 3 hours as needed for pain or other (Moderate to " Severe)   Quantity:  30 tablet   Refills:  0         CONTINUE these medicines which may have CHANGED, or have new prescriptions. If we are uncertain of the size of tablets/capsules you have at home, strength may be listed as something that might have changed.        Dose / Directions    fluticasone 50 MCG/ACT spray   Commonly known as:  FLONASE   This may have changed:    - when to take this  - reasons to take this   Used for:  Nasal drainage        Dose:  1-2 spray   Spray 1-2 sprays into both nostrils daily   Quantity:  16 g   Refills:  1         CONTINUE these medicines which have NOT CHANGED        Dose / Directions    aspirin 81 MG tablet        Dose:  81 mg   Take 81 mg by mouth daily   Refills:  0       celeBREX 200 MG capsule   Generic drug:  celecoxib        Dose:  200 mg   Take 1 capsule (200 mg) by mouth 2 times daily as needed for moderate pain   Quantity:  60 capsule   Refills:  1       simvastatin 40 MG tablet   Commonly known as:  ZOCOR   Used for:  Mixed hyperlipidemia        Dose:  40 mg   Take 1 tablet (40 mg) by mouth daily   Quantity:  90 tablet   Refills:  3       VITAMIN D-3 PO        Dose:  2000 mg   Take 2,000 mg by mouth daily   Refills:  0            Where to get your medicines      Some of these will need a paper prescription and others can be bought over the counter. Ask your nurse if you have questions.     Bring a paper prescription for each of these medications     oxyCODONE IR 5 MG tablet                Protect others around you: Learn how to safely use, store and throw away your medicines at www.disposemymeds.org.             Medication List: This is a list of all your medications and when to take them. Check marks below indicate your daily home schedule. Keep this list as a reference.      Medications           Morning Afternoon Evening Bedtime As Needed    aspirin 81 MG tablet   Take 81 mg by mouth daily                                celeBREX 200 MG capsule   Take 1 capsule (200  mg) by mouth 2 times daily as needed for moderate pain   Generic drug:  celecoxib                                fluticasone 50 MCG/ACT spray   Commonly known as:  FLONASE   Spray 1-2 sprays into both nostrils daily                                oxyCODONE IR 5 MG tablet   Commonly known as:  ROXICODONE   Take 1-2 tablets (5-10 mg) by mouth every 3 hours as needed for pain or other (Moderate to Severe)                                simvastatin 40 MG tablet   Commonly known as:  ZOCOR   Take 1 tablet (40 mg) by mouth daily                                VITAMIN D-3 PO   Take 2,000 mg by mouth daily

## 2017-11-03 NOTE — ANESTHESIA CARE TRANSFER NOTE
Patient: Kristopher Gomez    Procedure(s):  Right Inguinal Hernia Repair with Mesh  - Wound Class: I-Clean    Diagnosis: Inguinal Hernia   Diagnosis Additional Information: No value filed.    Anesthesia Type:   General, LMA     Note:  Airway :Face Mask  Patient transferred to:PACU  Comments: Report and sign off to RN in PACU.  Good resps, skin pink, monitors on, VSS,  O2 via Face Tent.Handoff Report: Identifed the Patient, Identified the Reponsible Provider, Reviewed the pertinent medical history, Discussed the surgical course, Reviewed Intra-OP anesthesia mangement and issues during anesthesia, Set expectations for post-procedure period and Allowed opportunity for questions and acknowledgement of understanding      Vitals: (Last set prior to Anesthesia Care Transfer)    CRNA VITALS  11/3/2017 0816 - 11/3/2017 0852      11/3/2017             Pulse: 82    SpO2: 99 %    Resp Rate (observed): 14    EKG: NSR                Electronically Signed By: GIN Keen CRNA  November 3, 2017  8:52 AM

## 2017-11-03 NOTE — DISCHARGE INSTRUCTIONS
HOME CARE FOLLOWING INGUINAL/FEMORAL HERNIA REPAIR  CAROL Gallegos E. Gavin, N. Guttormson, D. Maurer, SERENA Schulz    DIET:  No restrictions.  Increased fluid intake is recommended. While taking pain medications, increase dietary fiber or add a fiber supplementation like Metamucil or Citrucel to help prevent constipation - a possible side effect of pain medications.    NAUSEA:  If nauseated from the anesthetic/pain meds; rest in bed, get up cautiously with assistance, and drink clear liquids (juice, tea, broth).    ACTIVITY:  Light Activity -- you may immediately be up and about as tolerated.  Driving -- you may drive when comfortable and off narcotic pain medications.  Light Work -- resume when comfortable off pain medications.  (If you can drive, you probably can work.)  Strenuous Work/Activity -- limit lifting to 20 pounds for 3 weeks.  Active Sports (running, biking, etc.) -- cautiously resume after 4 weeks.    INCISIONAL CARE:    If you have a dressing in place, keep clean and dry for 48 hours; you may replace the gauze if it becomes soiled.    After 48 hours you may remove the dressing and shower.  Do not submerse incision in water for 1 week.    Sutures will absorb and need not be removed.    If present, leave the steri-strips (white paper tapes) in place for 14 days after surgery.    Expect a variable amount of swelling/black and blue discoloration that may involve the penis/scrotum or labia.    Some numbness around the incision is common.    A lump/ridge under the incision is normal and will gradually resolve.    DISCOMFORT:  Local anesthetic placed at surgery should provide relief for 4-8 hours.  Begin taking pain pills before discomfort is severe.  Take the pain medication with some food, when possible, to minimize side effects.  Intermittent use of ice packs to the hernia repair site may help during the first 1-3 weeks after surgery.  Expect gradual improvement.     Over-the-counter anti-inflammatory medications (i.e. Ibuprofen/Advil/Motrin or Naprosyn/Aleve) may be used per package instructions in addition to or while tapering off the narcotic pain medications to decrease swelling and sensitivity at the repair site.  DO NOT TAKE these Anti-inflammatory medications if your primary physician has advised against doing so, or if you have acid reflux, ulcer, or bleeding disorder, or take blood-thinner medications.  Call your primary physician or the surgery office if you have medication questions.    RETURN APPOINTMENT:  Schedule a follow-up visit 2-3 weeks post-op.  Office Phone:  [phone]     CONTACT US IF THE FOLLOWING DEVELOPS:   1. A fever that is above 101     2. If there is a large amount of drainage, bleeding, or swelling.   3. Severe pain that is not relieved by your prescription.   4. Drainage that is thick, cloudy, yellow, green or white.   5. Any other questions not answered by  Frequently Asked Questions  sheet.      FREQUENTLY ASKED QUESTIONS:    Q:  How should my incision look?    A:  Normally your incision will appear slightly swollen with light redness directly along the incision itself as it heals.  It may feel like a bump or ridge as the healing/scarring happens, and over time (3-4 months) this bump or ridge feeling should slowly go away.  In general, clear or pink watery drainage can be normal at first as your incision heals, but should decrease over time.    Q:  How do I know if my incision is infected?  A:  Look at your incision for signs of infection, like redness around the incision spreading to surrounding skin, or drainage of cloudy or foul-smelling drainage.  If you feel warm, check your temperature to see if you are running a fever.    **If any of these things occur, please notify the nurse at our office.  We may need you to come into the office for an incision check.      Q:  How do I take care of my incision?  A:  If you have a dressing in place  - Starting the day after surgery, replace the dressing 1-2 times a day until there is no further drainage from the incision.  At that time, a dressing is no longer needed.  Try to minimize tape on the skin if irritation is occurring at the tape sites.  If you have significant irritation from tape on the skin, please call the office to discuss other method of dressing your incision.    Small pieces of tape called  steri-strips  may be present directly overlying your incision; these may be removed 10 days after surgery unless otherwise specified by your surgeon.  If these tapes start to loosen at the ends, you may trim them back until they fall off or are removed.      Q:  There is a piece of tape or a sticky  lead  still on my skin.  Can I remove this?  A:  Sometimes the sticky  leads  used for monitoring during surgery or for evaluation in the emergency department are not all removed while you are in the hospital.  These sometimes have a tab or metal dot on them.  You can easily remove these on your own, like taking off a band-aid.  If there is a gel substance under the  lead , simply wipe/clean it off with a washcloth or paper towel.      Q:  What can I do to minimize constipation (very hard stools, or lack of stools)?  A:  Stay well hydrated.  Increase your dietary fiber intake or take a fiber supplement -with plenty of water.  Walk around frequently.  You may consider an over-the-counter stool-softener.  Your Pharmacist can assist you with choosing one that is stocked at your pharmacy.  Constipation is also one of the most common side effects of pain medication.  If you are using pain medication, be pro-active and try to PREVENT problems with constipation by taking the steps above BEFORE constipation becomes a problem.    Q:  What do I do if I need more pain medications?  A:  Call the office to receive refills.  Be aware that certain pain meds cannot be called into a pharmacy and actually require a paper  prescription.  A change may be made in your pain med as you progress thru your recovery period or if you have side effects to certain meds.    --Pain meds are NOT refilled after 5pm on weekdays, and NOT AT ALL on the weekends, so please look ahead to prevent problems.      Q:  Why am I having a hard time sleeping now that I am at home?  A:  Many medications you receive while you are in the hospital can impact your sleep for a number of days after your surgery/hospitalization.  Decreased level of activity and naps during the day may also make sleeping at night difficult.  Try to minimize day-time naps, and get up frequently during the day to walk around your home during your recovery time.  Sleep aides may be of some help, but are not recommended for long-term use.      Q:  I am having some back discomfort.  What should I do?  A:  This may be related to certain positioning that was required for your surgery, extended periods of time in bed, or other changes in your overall activity level.  You may try ice, heat, acetaminophen, or ibuprofen to treat this temporarily.  Note that many pain medications have acetaminophen in them and would state this on the prescription bottle.  Be sure not to exceed the maximum of 4000mg per day of acetaminophen.     **If the pain you are having does not resolve, is severe, or is a flare of back pain you have had on other occasions prior to surgery, please contact your primary physician for further recommendations or for an appointment to be examined at their office.    Q:  Why am I having headaches?  A:  Headaches can be caused by many things:  caffeine withdrawal, use of pain meds, dehydration, high blood pressure, lack of sleep, over-activity/exhaustion, flare-up of usual migraine headaches.  If you feel this is related to muscle tension (a band-like feeling around the head, or a pressure at the low-back of the head) you may try ice or heat to this area.  You may need to drink more  fluids (try electrolyte drink like Gatorade), rest, or take your usual migraine medications.   **If your headaches do not resolve, worsen, are accompanied by other symptoms, or if your blood pressure is high, please call your primary physician for recommendation and/or examination.    Q:  I am unable to urinate.  What do I do?  A:  A small percentage of people can have difficulty urinating initially after surgery.  This includes being able to urinate only a very small amount at a time and feeling discomfort or pressure in the very low abdomen.  This is called  urinary retention , and is actually an urgent situation.  Proceed to your nearest Emergency department for evaluation (not an Urgent Care Center).  Sometimes the bladder does not work correctly after certain medications you receive during surgery, or related to certain procedures.  You may need to have a catheter placed until your bladder recovers.  When planning to go to an Emergency department, it may help to call the ER to let them know you are coming in for this problem after a surgery.  This may help you get in quicker to be evaluated.  **If you have symptoms of a urinary tract infection, please contact your primary physician for the proper evaluation and treatment.          If you have other questions, please call the office Monday thru Friday between 8am and 5pm to discuss with the nurse or physician assistant.  #[phone]    There is a surgeon ON CALL on weekday evenings and over the weekend in case of urgent need only, and may be contacted at the same number.    If you are having an emergency, call 911 or proceed to your nearest emergency department.    GENERAL ANESTHESIA OR SEDATION CHILD DISCHARGE INSTRUCTIONS    YOUR CHILD SHOULD REST AND AVOID STRENUOUS PLAY FOR THE NEXT 24 HOURS.  MAKE ARRANGEMENTS TO HAVE AN ADULT STAY WITH HIM/HER FOR 24 HOURS AFTER DISCHARGE.    YOUR CHILD MAY FEEL DIZZY OR SLEEPY.  HE OR SHE SHOULD AVOID ACTIVITIES THAT  REQUIRE BALANCE (RIDING A BIKE, CLIMBING STAIRS, SKATING) FOR THE NEXT 24 HOURS.    YOU MAY OFFER YOUR CHILD CLEAR LIQUIDS (APPLE JUICE, GINGER ALE, 7-UP, GATORADE, BROTH, ETC.) AND PROGRESS TO A REGULAR DIET IF NO NAUSEA (FEELS SICK TO THE STOMACH) OR VOMITING (THROWS UP) EXISTS.         YOUR CHILD MAY HAVE A DRY MOUTH, SORE THROAT, MUSCLE ACHES OR NIGHTMARES.  THESE SHOULD GO AWAY WITHIN 24 HOURS.    CALL YOUR DOCTOR FOR ANY OF THE FOLLOWING:  SIGNS OF INFECTION (FEVER, GROWING TENDERNESS AT THE SURGERY SITE, A LARGE AMOUNT OF DRAINAGE OR BLEEDING, SEVERE PAIN, FOUL-SMELLING DRAINAGE, REDNESS, SWELLING).    IT HAS BEEN OVER 8 TO 10 HOURS SINCE SURGERY AND YOUR CHILD IS STILL NOT ABLE TO URINATE (PASS WATER) OR IS COMPLAINING ABOUT NOT BEING ABLE TO URINATE.

## 2017-11-03 NOTE — ANESTHESIA POSTPROCEDURE EVALUATION
Patient: Kristopher Gomez    Procedure(s):  Right Inguinal Hernia Repair with Mesh  - Wound Class: I-Clean    Diagnosis:Inguinal Hernia   Diagnosis Additional Information: Right inguinal hernia, indirect    Anesthesia Type:  General, LMA    Note:  Anesthesia Post Evaluation    Patient location during evaluation: PACU  Patient participation: Able to fully participate in evaluation  Level of consciousness: awake and alert  Pain management: adequate  Airway patency: patent  Cardiovascular status: acceptable  Respiratory status: acceptable  Hydration status: acceptable  PONV: none     Anesthetic complications: None          Last vitals:  Vitals:    11/03/17 0920 11/03/17 0925 11/03/17 0932   BP: 126/71 124/78 124/78   Pulse:      Resp: 14 15 14   Temp:   97.5  F (36.4  C)   SpO2: 94% 93% 95%         Electronically Signed By: Talha Child MD  November 3, 2017  9:45 AM

## 2017-11-03 NOTE — OP NOTE
General Surgery Operative Note      Pre-operative diagnosis: Right inguinal hernia   Post-operative diagnosis: Right inguinal hernia, indirect    Procedure: Indirect right inguinal hernia repair with Ultrapro hernia system (large size)   Surgeon: Reginaldo Bearden MD   Assistant(s): Gloria Gavin PA-C  A PA was medically necessary for their expertise in prepping, retracting, exposure, and suctioning.   Anesthesia: General    Estimated blood loss:  Complications: 5 cc  None   Specimens: * No specimens in log *     DESCRIPTION OF PROCEDURE:  The patient was placed on the table in supine position.  General endotracheal anesthesia was induced and the abdomen was prepped and draped in sterile fashion.  A pause was performed, the site had been marked with the patient in pre-induction.  An inguinal incision was made on the right side and was carried down through the subcutaneous tissue.  The external oblique aponeurosis was cleared of subcutaneous tissue.  The external oblique aponeurosis was incised parallel to its fibers through the external ring.  Flaps were developed superomedially and inferolaterally.  The cord was encircled with a penrose drain at the level of the pubic tubercle. This allowed for good exposure of the inguinal canal.  This revealed an intact floor of the inguinal canal.  We then performed a thorough evaluation of the spermatic cord. Cremaster fibers were split and the nerve was preserved.  There was a moderately large indirect hernia defect noted.  The sac was mobilized from normal cord structures and reduced.  The pre-peritoneal space was developed to accept the inner layer of the Ultrapro hernia system. The operative field and the Ultrapro mesh were irrigated with irricept and rinsed. The inner layer of the mesh was carefully deployed in the preperitoneal space. The outer layer was deployed. A slit had been cut for egress of the cord and nerve. This was coapted adjacent to the cord, thus  re-enforcing the internal ring. The medial portion of the outer layer was sutured to the inguinal ligament (wrapping the ligament with the mesh), overlapped and sutured adjacent to the pubic tubercle and then sutured to the anterior rectus sheath as far medially as possible. The other portion of the outer layer was tucked up under the external oblique aponeurosis.  The cord was evaluated with a sterile doppler and excellent arterial and venous signals were noted. There was no venous engorgement of the cord.   Local anesthetic was injected for post op pain relief. .  We inspected for hemostasis and irrigated with sterile saline.  We closed the external oblique aponeurosis with a 2-0 PDS suture in running fashion.  The Mayra's fascia was closed with 2-0 PDS sutures.  The skin was closed with a running 4-0 Vicryl subcuticular suture and dressings were applied.  The patient tolerated the procedure well.  Sponge, needle and instrument counts were correct at the end of the case. The testicle position was verified after the procedure.    Reginaldo Bearden MD

## 2017-11-03 NOTE — IP AVS SNAPSHOT
[facility]    [address]    [facility] [place] [address]    Phone:  [phone]    Fax:  [phone]                                       After Visit Summary   11/3/2017    Kristopher Gomez    MRN: [id number]           After Visit Summary Signature Page     I have received my discharge instructions, and my questions have been answered. I have discussed any challenges I see with this plan with the nurse or doctor.    ..........................................................................................................................................  Patient/Patient Representative Signature      ..........................................................................................................................................  Patient Representative Print Name and Relationship to Patient    ..................................................               ................................................  Date                                            Time    ..........................................................................................................................................  Reviewed by Signature/Title    ...................................................              ..............................................  Date                                                            Time

## 2017-11-16 ENCOUNTER — TRANSFERRED RECORDS (OUTPATIENT)
Dept: FAMILY MEDICINE | Facility: CLINIC | Age: 57
End: 2017-11-16

## 2017-11-21 ENCOUNTER — OFFICE VISIT (OUTPATIENT)
Dept: SURGERY | Facility: CLINIC | Age: 57
End: 2017-11-21
Payer: COMMERCIAL

## 2017-11-21 VITALS — HEIGHT: 75 IN | BODY MASS INDEX: 32.58 KG/M2 | WEIGHT: 262 LBS

## 2017-11-21 DIAGNOSIS — Z09 SURGICAL FOLLOWUP VISIT: Primary | ICD-10-CM

## 2017-11-21 PROCEDURE — 99024 POSTOP FOLLOW-UP VISIT: CPT | Performed by: PHYSICIAN ASSISTANT

## 2017-11-21 NOTE — MR AVS SNAPSHOT
"              After Visit Summary   11/21/2017    Kristopher Gomez    MRN: [id number]           Patient Information     Date Of Birth          1960        Visit Information        Provider Department      11/21/2017 1:30 PM Gloria Gavin PA-C [facility]      Today's Diagnoses     Surgical followup visit    -  1       Follow-ups after your visit        Follow-up notes from your care team     Return if symptoms worsen or fail to improve.      Who to contact     If you have questions or need follow up information about today's clinic visit or your schedule please contact [facility] directly at [phone].  Normal or non-critical lab and imaging results will be communicated to you by [employer] (air taxi)hart, letter or phone within 4 business days after the clinic has received the results. If you do not hear from us within 7 days, please contact the clinic through [employer] (air [employer]hart or phone. If you have a critical or abnormal lab result, we will notify you by phone as soon as possible.  Submit refill requests through Robert Applebaum MD or call your pharmacy and they will forward the refill request to us. Please allow 3 business days for your refill to be completed.          Additional Information About Your Visit        MyChart Information     Robert Applebaum MD gives you secure access to your electronic health record. If you see a primary care provider, you can also send messages to your care team and make appointments. If you have questions, please call your primary care clinic.  If you do not have a primary care provider, please call [phone] and they will assist you.        Care EveryWhere ID     This is your Care EveryWhere ID. This could be used by other organizations to access your [facility] medical records  [id number]        Your Vitals Were     Height BMI (Body Mass Index)                1.905 m (6' 3\") 32.75 kg/m2           Blood Pressure from Last 3 " Encounters:   11/03/17 139/87   10/06/17 126/80   05/04/17 138/80    Weight from Last 3 Encounters:   11/21/17 118.8 kg (262 lb)   11/03/17 118.8 kg (262 lb)   10/06/17 115.8 kg (255 lb 3.2 oz)              Today, you had the following     No orders found for display         Today's Medication Changes          These changes are accurate as of: 11/21/17  1:53 PM.  If you have any questions, ask your nurse or doctor.               These medicines have changed or have updated prescriptions.        Dose/Directions    fluticasone 50 MCG/ACT spray   Commonly known as:  FLONASE   This may have changed:    - when to take this  - reasons to take this   Used for:  Nasal drainage        Dose:  1-2 spray   Spray 1-2 sprays into both nostrils daily   Quantity:  16 g   Refills:  1                Primary Care Provider Office Phone # Fax #    Talha Calvin Licona [phone] [address] [address]        Equal Access to Services     [facility]: Hadii kodak ku hadasho Soomaali, waaxda luqadaha, qaybta kaalmada adeegyada, claribel veras . So [facility] [phone].    ATENCIÓN: Si habla español, tiene a easton disposición servicios gratuitos de asistencia lingüística. Llame al [phone].    We comply with applicable federal civil rights laws and [place] laws. We do not discriminate on the basis of race, color, national origin, age, disability, sex, sexual orientation, or gender identity.            Thank you!     Thank you for choosing [facility]  for your care. Our goal is always to provide you with excellent care. Hearing back from our patients is one way we can continue to improve our services. Please take a few minutes to complete the written survey that you may receive in the mail after your visit with us. Thank you!             Your Updated Medication List - Protect others around you: Learn how to safely use, store and throw away  your medicines at www.disposemymeds.org.          This list is accurate as of: 11/21/17  1:53 PM.  Always use your most recent med list.                   Brand Name Dispense Instructions for use Diagnosis    aspirin 81 MG tablet      Take 81 mg by mouth daily        celeBREX 200 MG capsule   Generic drug:  celecoxib     60 capsule    Take 1 capsule (200 mg) by mouth 2 times daily as needed for moderate pain        fluticasone 50 MCG/ACT spray    FLONASE    16 g    Spray 1-2 sprays into both nostrils daily    Nasal drainage       oxyCODONE IR 5 MG tablet    ROXICODONE    30 tablet    Take 1-2 tablets (5-10 mg) by mouth every 3 hours as needed for pain or other (Moderate to Severe)    Right inguinal hernia       simvastatin 40 MG tablet    ZOCOR    90 tablet    Take 1 tablet (40 mg) by mouth daily    Mixed hyperlipidemia       VITAMIN D-3 PO      Take 2,000 mg by mouth daily

## 2017-11-21 NOTE — PROGRESS NOTES
11/21/2017    [facility]     Subjective:  Kristopher Gomez is here for his first postoperative visit. He underwent a right inguinal  hernia repair with mesh by Dr. Bearden on 11/3/2017. Today he  tells me he has been feeling well since surgery. He currently does not require narcotic pain medications, he is eating a normal diet and his bowels are regular. He has no concerns today.    Objective:  Abd - Abdomen soft, non-tender.   Inc - Healing well, well approximated and without signs of infection.  Appropriate ridge of scar tissue beneath incision    Assessment:  S/p right inguinal  hernia repair with mesh.     Plan:  RTC PRN      Gloria Gavin PA-C      Please route or send letter to:  Primary Care Provider (PCP)

## 2018-02-12 ENCOUNTER — TELEPHONE (OUTPATIENT)
Dept: FAMILY MEDICINE | Facility: CLINIC | Age: 58
End: 2018-02-12

## 2018-02-12 NOTE — TELEPHONE ENCOUNTER
Records printed, invoice in the amount of $72.85 faxed to [facility],    waiting on payment to release records.

## 2018-04-10 ENCOUNTER — TRANSFERRED RECORDS (OUTPATIENT)
Dept: FAMILY MEDICINE | Facility: CLINIC | Age: 58
End: 2018-04-10

## 2018-10-25 ENCOUNTER — TELEPHONE (OUTPATIENT)
Dept: FAMILY MEDICINE | Facility: CLINIC | Age: 58
End: 2018-10-25

## 2018-10-25 DIAGNOSIS — E78.2 MIXED HYPERLIPIDEMIA: ICD-10-CM

## 2018-10-25 RX ORDER — SIMVASTATIN 40 MG
40 TABLET ORAL DAILY
Qty: 30 TABLET | Refills: 0 | COMMUNITY
Start: 2018-10-25 | End: 2018-11-16

## 2018-10-25 NOTE — TELEPHONE ENCOUNTER
Called #30 of simvastatin to [facility] pharmacy.  Please call to schedule a fasting CPX for patient.

## 2018-11-16 ENCOUNTER — TELEPHONE (OUTPATIENT)
Dept: FAMILY MEDICINE | Facility: CLINIC | Age: 58
End: 2018-11-16

## 2018-11-16 DIAGNOSIS — E78.2 MIXED HYPERLIPIDEMIA: ICD-10-CM

## 2018-11-16 RX ORDER — SIMVASTATIN 40 MG
40 TABLET ORAL DAILY
Qty: 15 TABLET | Refills: 0 | COMMUNITY
Start: 2018-11-16 | End: 2018-12-21

## 2018-12-19 ENCOUNTER — TRANSFERRED RECORDS (OUTPATIENT)
Dept: FAMILY MEDICINE | Facility: CLINIC | Age: 58
End: 2018-12-19

## 2018-12-21 ENCOUNTER — OFFICE VISIT (OUTPATIENT)
Dept: FAMILY MEDICINE | Facility: CLINIC | Age: 58
End: 2018-12-21

## 2018-12-21 VITALS
OXYGEN SATURATION: 95 % | HEIGHT: 74 IN | HEART RATE: 65 BPM | DIASTOLIC BLOOD PRESSURE: 72 MMHG | SYSTOLIC BLOOD PRESSURE: 130 MMHG | WEIGHT: 260.6 LBS | BODY MASS INDEX: 33.45 KG/M2 | TEMPERATURE: 97.8 F

## 2018-12-21 DIAGNOSIS — Z00.00 ROUTINE HISTORY AND PHYSICAL EXAMINATION OF ADULT: Primary | ICD-10-CM

## 2018-12-21 DIAGNOSIS — R73.01 ELEVATED FASTING GLUCOSE: ICD-10-CM

## 2018-12-21 DIAGNOSIS — E78.2 MIXED HYPERLIPIDEMIA: ICD-10-CM

## 2018-12-21 DIAGNOSIS — N52.9 ERECTILE DYSFUNCTION, UNSPECIFIED ERECTILE DYSFUNCTION TYPE: ICD-10-CM

## 2018-12-21 LAB — HBA1C MFR BLD: 5.6 % (ref 4–7)

## 2018-12-21 PROCEDURE — 36415 COLL VENOUS BLD VENIPUNCTURE: CPT | Performed by: PHYSICIAN ASSISTANT

## 2018-12-21 PROCEDURE — 84153 ASSAY OF PSA TOTAL: CPT | Mod: 90 | Performed by: PHYSICIAN ASSISTANT

## 2018-12-21 PROCEDURE — 80061 LIPID PANEL: CPT | Mod: 90 | Performed by: PHYSICIAN ASSISTANT

## 2018-12-21 PROCEDURE — 80053 COMPREHEN METABOLIC PANEL: CPT | Mod: 90 | Performed by: PHYSICIAN ASSISTANT

## 2018-12-21 PROCEDURE — 99396 PREV VISIT EST AGE 40-64: CPT | Performed by: PHYSICIAN ASSISTANT

## 2018-12-21 PROCEDURE — 83036 HEMOGLOBIN GLYCOSYLATED A1C: CPT | Performed by: PHYSICIAN ASSISTANT

## 2018-12-21 RX ORDER — SILDENAFIL 50 MG/1
50 TABLET, FILM COATED ORAL DAILY PRN
Qty: 30 TABLET | Refills: 6 | Status: SHIPPED | OUTPATIENT
Start: 2018-12-21 | End: 2021-01-22

## 2018-12-21 RX ORDER — SIMVASTATIN 40 MG
40 TABLET ORAL DAILY
Qty: 90 TABLET | Refills: 3 | Status: SHIPPED | OUTPATIENT
Start: 2018-12-21 | End: 2019-12-13

## 2018-12-21 SDOH — HEALTH STABILITY: MENTAL HEALTH: HOW OFTEN DO YOU HAVE A DRINK CONTAINING ALCOHOL?: 2-4 TIMES A MONTH

## 2018-12-21 SDOH — HEALTH STABILITY: MENTAL HEALTH: HOW OFTEN DO YOU HAVE 6 OR MORE DRINKS ON ONE OCCASION?: NEVER

## 2018-12-21 SDOH — HEALTH STABILITY: MENTAL HEALTH: HOW MANY STANDARD DRINKS CONTAINING ALCOHOL DO YOU HAVE ON A TYPICAL DAY?: 1 OR 2

## 2018-12-21 ASSESSMENT — MIFFLIN-ST. JEOR: SCORE: 2081.82

## 2018-12-21 NOTE — PROGRESS NOTES
SUBJECTIVE:   CC: Kristopher Gomez is an 58 year old male who presents for preventive health visit.     Healthy Habits:    Do you get at least three servings of calcium containing foods daily (dairy, green leafy vegetables, etc.)? yes    Amount of exercise or daily activities, outside of work: elliptical    Problems taking medications regularly No    Medication side effects: No    Have you had an eye exam in the past two years? yes    Do you see a dentist twice per year? yes    Do you have sleep apnea, excessive snoring or daytime drowsiness?no      Hyperlipidemia Follow-Up      Rate your low fat/cholesterol diet?: good    Taking statin?  No    Other lipid medications/supplements?:  none      ED  - rare issue - viagra didn't really help    Body mass index is 33.46 kg/m .    BP Readings from Last 6 Encounters:   12/21/18 130/72   11/03/17 139/87   10/06/17 126/80   05/04/17 138/80   04/07/17 110/70   03/31/17 110/80           Today's PHQ-2 Score:   PHQ-2 ( 1999 Pf[employer]) 12/21/2018 3/11/2014   Q1: Little interest or pleasure in doing things 0 0   Q2: Feeling down, depressed or hopeless 0 0   PHQ-2 Score 0 0       Abuse: Current or Past(Physical, Sexual or Emotional)- No  Do you feel safe in your environment? Yes    Social History     Tobacco Use     Smoking status: Never Smoker     Smokeless tobacco: Never Used   Substance Use Topics     Alcohol use: Yes     Alcohol/week: 1.0 oz     Types: 2 Standard drinks or equivalent per week     Frequency: 2-4 times a month     Drinks per session: 1 or 2     Binge frequency: Never     If you drink alcohol do you typically have >3 drinks per day or >7 drinks per week? No                      Last PSA:   [facility] PSA   Date Value Ref Range Status   03/11/2014 0.6 < OR = 4.0 ng/mL Final     Comment:        This test was performed using the Siemens  chemiluminescent method. Values obtained from  different assay methods cannot be used  interchangeably. PSA levels, regardless of  value,  should not be interpreted as absolute  evidence of the presence or absence of disease.          Reviewed orders with patient. Reviewed health maintenance and updated orders accordingly - Yes  Labs reviewed in EPIC  BP Readings from Last 3 Encounters:   12/21/18 130/72   11/03/17 139/87   10/06/17 126/80    Wt Readings from Last 3 Encounters:   12/21/18 118.2 kg (260 lb 9.6 oz)   11/21/17 118.8 kg (262 lb)   11/03/17 118.8 kg (262 lb)                  Patient Active Problem List   Diagnosis     Mixed hyperlipidemia     Primary osteoarthritis of right knee     Family history of cardiovascular disease     ACP (advance care planning)     Health Care Home     Allergic rhinitis     Erectile dysfunction, unspecified erectile dysfunction type     Past Surgical History:   Procedure Laterality Date     AS TOTAL KNEE ARTHROPLASTY Right 2015     HC FEMUR/KNEE SURG UNLISTED Right 1980    debridement of meniscal tear     HC VASECTOMY UNILAT/BILAT W POSTOP SEMEN      Vasectomy     HERNIORRHAPHY INGUINAL Right 11/3/2017    Procedure: HERNIORRHAPHY INGUINAL;  Right Inguinal Hernia Repair with Mesh ;  Surgeon: Reginaldo Bearden MD;  Location:  OR       Social History     Tobacco Use     Smoking status: Never Smoker     Smokeless tobacco: Never Used   Substance Use Topics     Alcohol use: Yes     Alcohol/week: 1.0 oz     Types: 2 Standard drinks or equivalent per week     Frequency: 2-4 times a month     Drinks per session: 1 or 2     Binge frequency: Never     Family History   Problem Relation Age of Onset     Cancer Mother 70        peritoneal     Heart Disease Father         MI x 10, cab x 2--ages 36 and 44     Diabetes Father      Coronary Artery Disease Father      Hyperlipidemia Father      Other - See Comments Sister         prediabetes     Coronary Artery Disease Paternal Grandfather      Cancer - colorectal No family hx of      Prostate Cancer No family hx of          Current Outpatient Medications   Medication Sig Dispense  Refill     aspirin 81 MG tablet Take 81 mg by mouth daily        Cetirizine HCl (ZYRTEC ALLERGY PO)        sildenafil (VIAGRA) 50 MG tablet Take 1 tablet (50 mg) by mouth daily as needed (as needed) 30 tablet 6     simvastatin (ZOCOR) 40 MG tablet Take 1 tablet (40 mg) by mouth daily 90 tablet 3     celecoxib (CELEBREX) 200 MG capsule Take 1 capsule (200 mg) by mouth 2 times daily as needed for moderate pain 60 capsule 1     Cholecalciferol (VITAMIN D-3 PO) Take 2,000 mg by mouth daily        fluticasone (FLONASE) 50 MCG/ACT nasal spray Spray 1-2 sprays into both nostrils daily (Patient taking differently: Spray 1-2 sprays into both nostrils daily as needed ) 16 g 1     oxyCODONE IR (ROXICODONE) 5 MG tablet Take 1-2 tablets (5-10 mg) by mouth every 3 hours as needed for pain or other (Moderate to Severe) (Patient not taking: Reported on 11/21/2017) 30 tablet 0     Allergies   Allergen Reactions     No Known Allergies      Recent Labs   Lab Test 12/21/18  1111 10/06/17  1117 09/02/16  0835 02/27/16  2227 05/12/15  0856   A1C 5.6  --   --   --   --    LDL  --  110* 109  --  117   HDL  --  61 52  --  57   TRIG  --  75 114  --  81   ALT  --  8* 11  --  9   CR  --  1.13 1.12  --  1.11   GFRESTIMATED  --  73 75 63 76   GFRESTBLACK  --   --   --  77  --    POTASSIUM  --  4.5 4.8 3.6 4.9        Reviewed and updated as needed this visit by clinical staff  Tobacco  Allergies  Meds  Problems         Reviewed and updated as needed this visit by Provider        Past Medical History:   Diagnosis Date     Mixed hyperlipidemia 5/23/2003     Other internal derangement of knee(717.89) 1980    right medial meniscus tear s/p arthroscopy, debridement      Past Surgical History:   Procedure Laterality Date     AS TOTAL KNEE ARTHROPLASTY Right 2015     HC FEMUR/KNEE SURG UNLISTED Right 1980    debridement of meniscal tear     HC VASECTOMY UNILAT/BILAT W POSTOP SEMEN      Vasectomy     HERNIORRHAPHY INGUINAL Right 11/3/2017     "Procedure: HERNIORRHAPHY INGUINAL;  Right Inguinal Hernia Repair with Mesh ;  Surgeon: Reginaldo Bearden MD;  Location: [facility] OR       ROS:  CONSTITUTIONAL: NEGATIVE for fever, chills, change in weight  INTEGUMENTARY/SKIN: NEGATIVE for worrisome rashes, moles or lesions  EYES: NEGATIVE for vision changes or irritation  ENT: NEGATIVE for ear, mouth and throat problems  RESP: NEGATIVE for significant cough or SOB  CV: NEGATIVE for chest pain, palpitations or peripheral edema  GI: NEGATIVE for nausea, abdominal pain, heartburn, or change in bowel habits   male: negative for dysuria, hematuria, decreased urinary stream, erectile dysfunction, urethral discharge  MUSCULOSKELETAL: NEGATIVE for significant arthralgias or myalgia  NEURO: NEGATIVE for weakness, dizziness or paresthesias  PSYCHIATRIC: NEGATIVE for changes in mood or affect    OBJECTIVE:   /72 (BP Location: Right arm, Patient Position: Sitting, Cuff Size: Adult Large)   Pulse 65   Temp 97.8  F (36.6  C) (Oral)   Ht 1.88 m (6' 2\")   Wt 118.2 kg (260 lb 9.6 oz)   SpO2 95%   BMI 33.46 kg/m      EXAM:  GENERAL: healthy, alert and no distress  EYES: Eyes grossly normal to inspection, PERRL and conjunctivae and sclerae normal  HENT: ear canals and TM's normal, nose and mouth without ulcers or lesions  NECK: no adenopathy, no asymmetry, masses, or scars and thyroid normal to palpation  RESP: lungs clear to auscultation - no rales, rhonchi or wheezes  CV: regular rate and rhythm, normal S1 S2, no S3 or S4, no murmur, click or rub, no peripheral edema and peripheral pulses strong  ABDOMEN: soft, nontender, no hepatosplenomegaly, no masses and bowel sounds normal   (male): normal male genitalia without lesions or urethral discharge, no hernia  RECTAL: normal sphincter tone, no rectal masses, prostate normal size, smooth, nontender without nodules or masses  MS: no gross musculoskeletal defects noted, no edema  SKIN: no suspicious lesions or rashes  NEURO: " "Normal strength and tone, mentation intact and speech normal  PSYCH: mentation appears normal, affect normal/bright        ASSESSMENT/PLAN:   (Z00.00) Routine history and physical examination of adult  (primary encounter diagnosis)  Comment: annual  Plan: Comprehensive metabolic panel, VENOUS         COLLECTION, Lipid Profile, HCL PSA, SCREENING         ([facility])            (E78.2) Mixed hyperlipidemia  Comment: stable  Plan: VENOUS COLLECTION, Lipid Profile, simvastatin         (ZOCOR) 40 MG tablet            (N52.9) Erectile dysfunction, unspecified erectile dysfunction type  Comment: stable  Plan: VENOUS COLLECTION, HCL PSA, SCREENING ([facility]),         sildenafil (VIAGRA) 50 MG tablet        Trial of Viagra again - 100 mg max   Coupon given    (R73.01) Elevated fasting glucose  Comment: stable  Plan: Comprehensive metabolic panel, Hemoglobin A1c,         VENOUS COLLECTION        Carb/sugar reduction advised      COUNSELING:  Special attention given to:        Regular exercise       Healthy diet/nutrition       Colon cancer screening       Prostate cancer screening    BP Readings from Last 1 Encounters:   12/21/18 130/72     Estimated body mass index is 33.46 kg/m  as calculated from the following:    Height as of this encounter: 1.88 m (6' 2\").    Weight as of this encounter: 118.2 kg (260 lb 9.6 oz).    BP Screening:   Last 3 BP Readings:    BP Readings from Last 3 Encounters:   12/21/18 130/72   11/03/17 139/87   10/06/17 126/80       The following was recommended to the patient:  Re-screen BP within a year and recommended lifestyle modifications  Weight management plan: Discussed healthy diet and exercise guidelines     reports that  has never smoked. he has never used smokeless tobacco.      Counseling Resources:  ATP IV Guidelines  Pooled Cohorts Equation Calculator  FRAX Risk Assessment  ICSI Preventive Guidelines  Dietary Guidelines for Americans, 2010  USDA's MyPlate  ASA Prophylaxis  Lung CA " Screening    [facility]  [facility]

## 2018-12-21 NOTE — NURSING NOTE
Kristopher is here for CPX fasting    Patient is here for a full physical exam.    Pre-Visit Screening :  Immunizations : up to date  Colon Screening : is up to date  Mammogram: NA  Asthma Action Test/Plan : NA  PHQ9 :  None  GAD7 :  None  Patient's  BMI Body mass index is 33.46 kg/m .  Questioned patient about current smoking habits.  Pt. has never smoked.  OK to leave a detailed voice message regarding today's visit Yes, phone # [phone]      ETOH screening:  Questions:  1-How often do you have a drink containing alcohol?                             1 times per week(s)  2-How many drinks containing alcohol do you have on a typical day when you are         Drinking?                              2   3- How often do you have 5 or more drinks on one occasion?                              Never

## 2018-12-22 LAB
ABBOTT PSA - QUEST: 0.8 NG/ML
ALBUMIN SERPL-MCNC: 4.1 G/DL (ref 3.6–5.1)
ALBUMIN/GLOB SERPL: 1.6 (CALC) (ref 1–2.5)
ALP SERPL-CCNC: 79 U/L (ref 40–115)
ALT SERPL-CCNC: 8 U/L (ref 9–46)
AST SERPL-CCNC: 17 U/L (ref 10–35)
BILIRUB SERPL-MCNC: 0.9 MG/DL (ref 0.2–1.2)
BUN SERPL-MCNC: 17 MG/DL (ref 7–25)
BUN/CREATININE RATIO: ABNORMAL (CALC) (ref 6–22)
CALCIUM SERPL-MCNC: 9.2 MG/DL (ref 8.6–10.3)
CHLORIDE SERPLBLD-SCNC: 105 MMOL/L (ref 98–110)
CHOLEST SERPL-MCNC: 193 MG/DL
CHOLEST/HDLC SERPL: 3.5 (CALC)
CO2 SERPL-SCNC: 24 MMOL/L (ref 20–32)
CREAT SERPL-MCNC: 1.08 MG/DL (ref 0.7–1.33)
EGFR AFRICAN AMERICAN - QUEST: 88 ML/MIN/1.73M2
GFR SERPL CREATININE-BSD FRML MDRD: 76 ML/MIN/1.73M2
GLOBULIN, CALCULATED - QUEST: 2.6 G/DL (CALC) (ref 1.9–3.7)
GLUCOSE - QUEST: 101 MG/DL (ref 65–99)
HDLC SERPL-MCNC: 55 MG/DL
LDLC SERPL CALC-MCNC: 115 MG/DL (CALC)
NONHDLC SERPL-MCNC: 138 MG/DL (CALC)
POTASSIUM SERPL-SCNC: 4.1 MMOL/L (ref 3.5–5.3)
PROT SERPL-MCNC: 6.7 G/DL (ref 6.1–8.1)
SODIUM SERPL-SCNC: 140 MMOL/L (ref 135–146)
TRIGL SERPL-MCNC: 121 MG/DL

## 2019-02-12 ENCOUNTER — TRANSFERRED RECORDS (OUTPATIENT)
Dept: FAMILY MEDICINE | Facility: CLINIC | Age: 59
End: 2019-02-12

## 2019-05-03 ENCOUNTER — OFFICE VISIT (OUTPATIENT)
Dept: FAMILY MEDICINE | Facility: CLINIC | Age: 59
End: 2019-05-03

## 2019-05-03 VITALS
SYSTOLIC BLOOD PRESSURE: 108 MMHG | BODY MASS INDEX: 31.51 KG/M2 | OXYGEN SATURATION: 97 % | TEMPERATURE: 97.9 F | HEIGHT: 75 IN | HEART RATE: 65 BPM | WEIGHT: 253.4 LBS | DIASTOLIC BLOOD PRESSURE: 68 MMHG

## 2019-05-03 DIAGNOSIS — Z01.818 PREOP GENERAL PHYSICAL EXAM: Primary | ICD-10-CM

## 2019-05-03 DIAGNOSIS — J31.0 CHRONIC RHINITIS: ICD-10-CM

## 2019-05-03 DIAGNOSIS — E78.2 MIXED HYPERLIPIDEMIA: ICD-10-CM

## 2019-05-03 DIAGNOSIS — M17.12 PRIMARY OSTEOARTHRITIS OF LEFT KNEE: ICD-10-CM

## 2019-05-03 DIAGNOSIS — N52.9 ERECTILE DYSFUNCTION, UNSPECIFIED ERECTILE DYSFUNCTION TYPE: ICD-10-CM

## 2019-05-03 LAB — HEMOGLOBIN: 15.2 G/DL (ref 13.3–17.7)

## 2019-05-03 PROCEDURE — 99214 OFFICE O/P EST MOD 30 MIN: CPT | Performed by: PHYSICIAN ASSISTANT

## 2019-05-03 PROCEDURE — 85018 HEMOGLOBIN: CPT | Performed by: PHYSICIAN ASSISTANT

## 2019-05-03 PROCEDURE — 36415 COLL VENOUS BLD VENIPUNCTURE: CPT | Performed by: PHYSICIAN ASSISTANT

## 2019-05-03 ASSESSMENT — MIFFLIN-ST. JEOR: SCORE: 2065.04

## 2019-05-03 NOTE — PROGRESS NOTES
[facility]  [address] [place] [address]  [address]  [facility] [address]  [phone]  Dept: [phone]    PRE-OP EVALUATION:  Today's date: 5/3/2019    Kristopher Gomez (: 1960) presents for pre-operative evaluation assessment as requested by Dr. Cooper.  He requires evaluation and anesthesia risk assessment prior to undergoing surgery/procedure for treatment of left total knee replacement .    Proposed Surgery/ Procedure: left total knee replacement  Date of Surgery/ Procedure: 19  Time of Surgery/ Procedure:   Hospital/Surgical Facility: [facility]  Fax number for surgical facility: [phone]  Primary Physician: Talha Licona  Type of Anesthesia Anticipated: to be determined    Patient has a Health Care Directive or Living Will:  NO, pt does have one    1. NO - Do you have a history of heart attack, stroke, stent, bypass or surgery on an artery in the head, neck, heart or legs?  2. NO - Do you ever have any pain or discomfort in your chest?  3. NO - Do you have a history of  Heart Failure?  4. NO - Are you troubled by shortness of breath when: walking on the level, up a slight hill or at night?  5. YES - Do you currently have a cold, bronchitis or other respiratory infection? Started last Thursday - improving  6. NO - Do you have a cough, shortness of breath or wheezing?  7. NO - Do you sometimes get pains in the calves of your legs when you walk?  8. NO - Do you or anyone in your family have previous history of blood clots?  9. NO - Do you or does anyone in your family have a serious bleeding problem such as prolonged bleeding following surgeries or cuts?  10. NO - Have you ever had problems with anemia or been told to take iron pills?  11. NO - Have you had any abnormal blood loss such as black, tarry or bloody stools, or abnormal vaginal bleeding?  12. NO - Have you ever had a blood transfusion?  13. NO - Have you or any of your relatives ever  had problems with anesthesia?  14. NO - Do you have sleep apnea, excessive snoring or daytime drowsiness?  15. NO - Do you have any prosthetic heart valves?  16. YES - Do you have prosthetic joints? Right total knee        HPI:     HPI related to upcoming procedure: OA of left knee      HYPERLIPIDEMIA - Patient has a long history of significant Hyperlipidemia requiring medication for treatment with recent good control. Patient reports no problems or side effects with the medication.                                                                                                                                                       .    MEDICAL HISTORY:     Patient Active Problem List    Diagnosis Date Noted     Mixed hyperlipidemia 05/23/2003     Priority: High     Erectile dysfunction, unspecified erectile dysfunction type 09/02/2016     Priority: Medium     Chronic rhinitis 03/11/2014     Priority: Medium     Uses zyrtec         Family history of cardiovascular disease 05/18/2012     Priority: Medium     ACP (advance care planning) 12/05/2012     Priority: Low      Advance Care Planning 9/2/2016: ACP Review of Chart / Resources Provided:  Reviewed chart for advance care plan.  Kristopher Gomez has   Added by [facility] 12/05/2012     Priority: Low     State Tier Level:  Tier 1  Status:  n/a  Care Coordinator:   See Letters for [facility] Care Plan              Past Medical History:   Diagnosis Date     Mixed hyperlipidemia 5/23/2003     Other internal derangement of knee(717.89) 1980    right medial meniscus tear s/p arthroscopy, debridement     Past Surgical History:   Procedure Laterality Date     AS TOTAL KNEE ARTHROPLASTY Right 2015     HC FEMUR/KNEE SURG UNLISTED Right 1980    debridement of meniscal tear     HC VASECTOMY UNILAT/BILAT W POSTOP SEMEN      Vasectomy     HERNIORRHAPHY INGUINAL Right 11/3/2017    Procedure: HERNIORRHAPHY INGUINAL;  Right Inguinal Hernia Repair with Mesh  ";  Surgeon: Reginaldo Bearden MD;  Location: [facility] OR     Current Outpatient Medications   Medication Sig Dispense Refill     aspirin 81 MG tablet Take 81 mg by mouth daily        celecoxib (CELEBREX) 200 MG capsule Take 1 capsule (200 mg) by mouth 2 times daily as needed for moderate pain 60 capsule 1     Cetirizine HCl (ZYRTEC ALLERGY PO)        Cholecalciferol (VITAMIN D-3 PO) Take 2,000 mg by mouth daily        fluticasone (FLONASE) 50 MCG/ACT nasal spray Spray 1-2 sprays into both nostrils daily (Patient taking differently: Spray 1-2 sprays into both nostrils daily as needed ) 16 g 1     oxyCODONE IR (ROXICODONE) 5 MG tablet Take 1-2 tablets (5-10 mg) by mouth every 3 hours as needed for pain or other (Moderate to Severe) 30 tablet 0     sildenafil (VIAGRA) 50 MG tablet Take 1 tablet (50 mg) by mouth daily as needed (as needed) 30 tablet 6     simvastatin (ZOCOR) 40 MG tablet Take 1 tablet (40 mg) by mouth daily 90 tablet 3     OTC products: Celebrex    Allergies   Allergen Reactions     No Known Allergies       Latex Allergy: NO    Social History     Tobacco Use     Smoking status: Never Smoker     Smokeless tobacco: Never Used   Substance Use Topics     Alcohol use: Yes     Alcohol/week: 1.0 oz     Types: 2 Standard drinks or equivalent per week     Frequency: 2-4 times a month     Drinks per session: 1 or 2     Binge frequency: Never     History   Drug Use No       REVIEW OF SYSTEMS:   Constitutional, neuro, ENT, endocrine, pulmonary, cardiac, gastrointestinal, genitourinary, musculoskeletal, integument and psychiatric systems are negative, except as otherwise noted.    EXAM:   /68 (BP Location: Right arm, Patient Position: Sitting, Cuff Size: Adult Large)   Pulse 65   Temp 97.9  F (36.6  C) (Oral)   Ht 1.905 m (6' 3\")   Wt 114.9 kg (253 lb 6.4 oz)   SpO2 97%   BMI 31.67 kg/m      GENERAL APPEARANCE: healthy, alert and no distress     EYES: EOMI,  PERRL     HENT: ear canals and TM's normal and nose " and mouth without ulcers or lesions     NECK: no adenopathy, no asymmetry, masses, or scars and thyroid normal to palpation     RESP: lungs clear to auscultation - no rales, rhonchi or wheezes     CV: regular rates and rhythm, normal S1 S2, no S3 or S4 and no murmur, click or rub     ABDOMEN:  soft, nontender, no HSM or masses and bowel sounds normal     MS: extremities normal- no gross deformities noted, no evidence of inflammation in joints, FROM in all extremities.     SKIN: no suspicious lesions or rashes     NEURO: Normal strength and tone, sensory exam grossly normal, mentation intact and speech normal     PSYCH: mentation appears normal. and affect normal/bright     LYMPHATICS: No cervical adenopathy    DIAGNOSTICS:     Labs Resulted Today:   Results for orders placed or performed in visit on 05/03/19   CL AFF HEMOGLOBIN (BFP)   Result Value Ref Range    Hemoglobin 15.2 13.3 - 17.7 g/dL       Recent Labs   Lab Test 12/21/18  1139 12/21/18  1111 10/06/17  1117 10/06/17  1051 04/07/17  1521   HGB  --   --   --  15.9 11.0*   PLT  --   --   --  301 252     --  139  --   --    POTASSIUM 4.1  --  4.5  --   --    CR 1.08  --  1.13  --   --    A1C  --  5.6  --   --   --         IMPRESSION:   Reason for surgery/procedure: Left knee arthritis  Diagnosis/reason for consult:  Preoperative clearance      The proposed surgical procedure is considered INTERMEDIATE risk.    REVISED CARDIAC RISK INDEX  The patient has the following serious cardiovascular risks for perioperative complications such as (MI, PE, VFib and 3  AV Block):  No serious cardiac risks  INTERPRETATION: 0 risks: Class I (very low risk - 0.4% complication rate)    The patient has the following additional risks for perioperative complications:  No identified additional risks      ICD-10-CM    1. Preop general physical exam Z01.818 VENOUS COLLECTION     CL AFF HEMOGLOBIN (BFP)   2. Primary osteoarthritis of left knee M17.12    3. Erectile dysfunction,  unspecified erectile dysfunction type N52.9    4. Mixed hyperlipidemia E78.2    5. Chronic rhinitis J31.0        RECOMMENDATIONS:         Hold all medications am of surgery    APPROVAL GIVEN to proceed with proposed procedure, without further diagnostic evaluation       Signed Electronically by: Opal Tariq PA-C    Copy of this evaluation report is provided to requesting physician.    [facility] Preop Guidelines    Revised Cardiac Risk Index

## 2019-06-24 NOTE — TELEPHONE ENCOUNTER
Payment in the amount of $48.21 paid by credit card. Records faxed 8/3/17  
Records printed, invoice in the amount of $48.21 faxed to [facility],    waiting on payment to release records.       will fax records once payment is made  
stable

## 2019-06-26 ENCOUNTER — TRANSFERRED RECORDS (OUTPATIENT)
Dept: FAMILY MEDICINE | Facility: CLINIC | Age: 59
End: 2019-06-26

## 2019-07-12 ENCOUNTER — TRANSFERRED RECORDS (OUTPATIENT)
Dept: FAMILY MEDICINE | Facility: CLINIC | Age: 59
End: 2019-07-12

## 2019-12-03 DIAGNOSIS — E78.2 MIXED HYPERLIPIDEMIA: ICD-10-CM

## 2019-12-04 RX ORDER — SIMVASTATIN 40 MG
TABLET ORAL
Qty: 90 TABLET | Refills: 3 | COMMUNITY
Start: 2019-12-04

## 2019-12-12 NOTE — PROGRESS NOTES
3  SUBJECTIVE:   CC: Kristopher Gomez is an 59 year old male who presents for preventive health visit.     Healthy Habits:    Do you get at least three servings of calcium containing foods daily (dairy, green leafy vegetables, etc.)? yes    Amount of exercise or daily activities, outside of work: 40 min aerobic, doing weight lifting    Problems taking medications regularly No    Medication side effects: No    Have you had an eye exam in the past two years? yes    Do you see a dentist twice per year? yes    Do you have sleep apnea, excessive snoring or daytime drowsiness?no        ED  - no AE        Wt Readings from Last 5 Encounters:   12/13/19 114.3 kg (252 lb)   05/03/19 114.9 kg (253 lb 6.4 oz)   12/21/18 118.2 kg (260 lb 9.6 oz)   11/21/17 118.8 kg (262 lb)   11/03/17 118.8 kg (262 lb)       Bilateral carpal tunnel - wearing night splints  Started in the last 2 years  Left > right  Typing  Tingling  [employer]      Hyperlipidemia Follow-Up      Are you regularly taking any medication or supplement to lower your cholesterol?   No    Are you having muscle aches or other side effects that you think could be caused by your cholesterol lowering medication?  No      Today's PHQ-2 Score:   PHQ-2 ( 1999 Pfizer) 5/3/2019 12/21/2018   Q1: Little interest or pleasure in doing things 0 0   Q2: Feeling down, depressed or hopeless 0 0   PHQ-2 Score 0 0       Abuse: Current or Past(Physical, Sexual or Emotional)- No  Do you feel safe in your environment? Yes        Social History     Tobacco Use     Smoking status: Never Smoker     Smokeless tobacco: Never Used   Substance Use Topics     Alcohol use: Yes     Alcohol/week: 1.7 standard drinks     Types: 2 Standard drinks or equivalent per week     Frequency: 2-4 times a month     Drinks per session: 1 or 2     Binge frequency: Never     If you drink alcohol do you typically have >3 drinks per day or >7 drinks per week? No                      Last PSA:   [facility]bbott PSA   Date Value  Ref Range Status   12/21/2018 0.8 < OR = 4.0 ng/mL Final     Comment:     The total PSA value from this assay system is   standardized against the WHO standard. The test   result will be approximately 20% lower when compared   to the equimolar-standardized total PSA (Arturo   Tamworth). Comparison of serial PSA results should be   interpreted with this fact in mind.     This test was performed using the Siemens   chemiluminescent method. Values obtained from   different assay methods cannot be used  interchangeably. PSA levels, regardless of  value, should not be interpreted as absolute  evidence of the presence or absence of disease.         Reviewed orders with patient. Reviewed health maintenance and updated orders accordingly - Yes  Lab work is in process  Labs reviewed in EPIC  BP Readings from Last 3 Encounters:   12/13/19 116/78   05/03/19 108/68   12/21/18 130/72    Wt Readings from Last 3 Encounters:   12/13/19 114.3 kg (252 lb)   05/03/19 114.9 kg (253 lb 6.4 oz)   12/21/18 118.2 kg (260 lb 9.6 oz)                  Patient Active Problem List   Diagnosis     Mixed hyperlipidemia     Family history of cardiovascular disease     ACP (advance care planning)     Health Care Home     Chronic rhinitis     Erectile dysfunction, unspecified erectile dysfunction type     Past Surgical History:   Procedure Laterality Date     AS TOTAL KNEE ARTHROPLASTY Right 2015     C TOTAL KNEE ARTHROPLASTY Left 2019     HC FEMUR/KNEE SURG UNLISTED Right 1980    debridement of meniscal tear     HC VASECTOMY UNILAT/BILAT W POSTOP SEMEN      Vasectomy     HERNIORRHAPHY INGUINAL Right 11/3/2017    Procedure: HERNIORRHAPHY INGUINAL;  Right Inguinal Hernia Repair with Mesh ;  Surgeon: Reginaldo Bearden MD;  Location:  OR       Social History     Tobacco Use     Smoking status: Never Smoker     Smokeless tobacco: Never Used   Substance Use Topics     Alcohol use: Yes     Alcohol/week: 1.7 standard drinks     Types: 2 Standard drinks or  equivalent per week     Frequency: 2-4 times a month     Drinks per session: 1 or 2     Binge frequency: Never     Family History   Problem Relation Age of Onset     Cancer Mother 70        peritoneal     Heart Disease Father         MI x 10, cab x 2--ages 36 and 44     Diabetes Father      Coronary Artery Disease Father      Hyperlipidemia Father      Other - See Comments Sister         prediabetes     Coronary Artery Disease Paternal Grandfather      Cancer - colorectal No family hx of      Prostate Cancer No family hx of          Current Outpatient Medications   Medication Sig Dispense Refill     aspirin 81 MG tablet Take 81 mg by mouth daily        celecoxib (CELEBREX) 200 MG capsule Take 1 capsule (200 mg) by mouth 2 times daily as needed for moderate pain 60 capsule 1     Cetirizine HCl (ZYRTEC ALLERGY PO)        fluticasone (FLONASE) 50 MCG/ACT nasal spray Spray 1-2 sprays into both nostrils daily (Patient taking differently: Spray 1-2 sprays into both nostrils daily as needed ) 16 g 1     sildenafil (VIAGRA) 50 MG tablet Take 1 tablet (50 mg) by mouth daily as needed (as needed) 30 tablet 6     simvastatin (ZOCOR) 40 MG tablet Take 1 tablet (40 mg) by mouth daily 90 tablet 3     Allergies   Allergen Reactions     No Known Allergies      Recent Labs   Lab Test 12/21/18  1139 12/21/18  1111 10/06/17  1117 09/02/16  0835 02/27/16  2227   A1C  --  5.6  --   --   --    *  --  110* 109  --    HDL 55  --  61 52  --    TRIG 121  --  75 114  --    ALT 8*  --  8* 11  --    CR 1.08  --  1.13 1.12  --    GFRESTIMATED 76  --  73 75 63   GFRESTBLACK  --   --   --   --  77   POTASSIUM 4.1  --  4.5 4.8 3.6        Reviewed and updated as needed this visit by clinical staff  Tobacco  Allergies  Problems  Soc Hx        Reviewed and updated as needed this visit by Provider        Past Medical History:   Diagnosis Date     Mixed hyperlipidemia 5/23/2003     Other internal derangement of knee(966.68) 1980    right  "medial meniscus tear s/p arthroscopy, debridement      Past Surgical History:   Procedure Laterality Date     AS TOTAL KNEE ARTHROPLASTY Right 2015     C TOTAL KNEE ARTHROPLASTY Left 2019     HC FEMUR/KNEE SURG UNLISTED Right 1980    debridement of meniscal tear     HC VASECTOMY UNILAT/BILAT W POSTOP SEMEN      Vasectomy     HERNIORRHAPHY INGUINAL Right 11/3/2017    Procedure: HERNIORRHAPHY INGUINAL;  Right Inguinal Hernia Repair with Mesh ;  Surgeon: Reginaldo Bearden MD;  Location: [facility] OR       ROS:  CONSTITUTIONAL: NEGATIVE for fever, chills, change in weight  INTEGUMENTARY/SKIN: NEGATIVE for worrisome rashes, moles or lesions  EYES: NEGATIVE for vision changes or irritation  ENT: NEGATIVE for ear, mouth and throat problems  RESP: NEGATIVE for significant cough or SOB  CV: NEGATIVE for chest pain, palpitations or peripheral edema  GI: NEGATIVE for nausea, abdominal pain, heartburn, or change in bowel habits   male: negative for dysuria, hematuria, decreased urinary stream, erectile dysfunction, urethral discharge  MUSCULOSKELETAL: + bilateral carpal tunnel - requesting referral for eval/possible injection  NEURO: NEGATIVE for weakness, dizziness or paresthesias  PSYCHIATRIC:  Recently lost friend due to ALS  Has had a hard time with grieving her death in the last 3 months  Wife has notice he is \"shorter\" than usual  in the last year.       OBJECTIVE:   /78 (BP Location: Right arm, Patient Position: Sitting, Cuff Size: Adult Large)   Pulse 60   Temp 97.3  F (36.3  C) (Oral)   Ht 1.892 m (6' 2.5\")   Wt 114.3 kg (252 lb)   SpO2 98%   BMI 31.92 kg/m           EXAM:  GENERAL: healthy, alert and no distress  EYES: Eyes grossly normal to inspection, PERRL and conjunctivae and sclerae normal  HENT: ear canals and TM's normal, nose and mouth without ulcers or lesions  NECK: no adenopathy, no asymmetry, masses, or scars and thyroid normal to palpation  RESP: lungs clear to auscultation - no rales, rhonchi or " "wheezes  CV: regular rate and rhythm, normal S1 S2, no S3 or S4, no murmur, click or rub, no peripheral edema and peripheral pulses strong  ABDOMEN: soft, nontender, no hepatosplenomegaly, no masses and bowel sounds normal   (male): normal male genitalia without lesions or urethral discharge, no hernia  MS: no gross musculoskeletal defects noted, no edema  Tinel's neg  SKIN: no suspicious lesions or rashes  NEURO: Normal strength and tone, mentation intact and speech normal  PSYCH: mentation appears normal, affect normal/bright    Diagnostic Test Results:  Labs reviewed in Epic    ASSESSMENT/PLAN:   1. Routine general medical examination at a health care facility    - Lipid Panel (BFP)  - Comprehensive Metobolic Panel (BFP)  - VENOUS COLLECTION    2. Mixed hyperlipidemia    - simvastatin (ZOCOR) 40 MG tablet; Take 1 tablet (40 mg) by mouth daily  Dispense: 90 tablet; Refill: 3  - Lipid Panel (BFP)  - Comprehensive Metobolic Panel (BFP)  - VENOUS COLLECTION    3. Erectile dysfunction, unspecified erectile dysfunction type      4. Chronic rhinitis      5. Grief reaction  Start Prozac  I reviewed the patient information handout from Up To Date on this medication including side effects with the patient.  Touch base via [employer] week after Mary Carmen  See me in clinic 6 weeks  Therapy advise  - FLUoxetine (PROZAC) 10 MG tablet; Take 1 tablet (10 mg) by mouth daily  Dispense: 30 tablet; Refill: 1    6. Bilateral carpal tunnel syndrome    - Other Specialty Referral    7. Obesity (BMI 30-39.9)    - Lipid Panel (BFP)  - Comprehensive Metobolic Panel (BFP)  - VENOUS COLLECTION    8. Elevated fasting glucose    - Hemoglobin A1c (BFP)    COUNSELING:  Special attention given to:        Regular exercise       Healthy diet/nutrition    Estimated body mass index is 31.92 kg/m  as calculated from the following:    Height as of this encounter: 1.892 m (6' 2.5\").    Weight as of this encounter: 114.3 kg (252 lb).    Weight management " plan: Discussed healthy diet and exercise guidelines     reports that he has never smoked. He has never used smokeless tobacco.      Counseling Resources:  ATP IV Guidelines  Pooled Cohorts Equation Calculator  FRAX Risk Assessment  ICSI Preventive Guidelines  Dietary Guidelines for Americans, 2010  USDA's MyPlate  ASA Prophylaxis  Lung CA Screening    [facility]  [facility]

## 2019-12-13 ENCOUNTER — OFFICE VISIT (OUTPATIENT)
Dept: FAMILY MEDICINE | Facility: CLINIC | Age: 59
End: 2019-12-13

## 2019-12-13 VITALS
TEMPERATURE: 97.3 F | BODY MASS INDEX: 31.33 KG/M2 | SYSTOLIC BLOOD PRESSURE: 116 MMHG | DIASTOLIC BLOOD PRESSURE: 78 MMHG | HEIGHT: 75 IN | HEART RATE: 60 BPM | WEIGHT: 252 LBS | OXYGEN SATURATION: 98 %

## 2019-12-13 DIAGNOSIS — F43.21 GRIEF REACTION: ICD-10-CM

## 2019-12-13 DIAGNOSIS — Z00.00 ROUTINE GENERAL MEDICAL EXAMINATION AT A HEALTH CARE FACILITY: Primary | ICD-10-CM

## 2019-12-13 DIAGNOSIS — R73.01 ELEVATED FASTING GLUCOSE: ICD-10-CM

## 2019-12-13 DIAGNOSIS — N52.9 ERECTILE DYSFUNCTION, UNSPECIFIED ERECTILE DYSFUNCTION TYPE: ICD-10-CM

## 2019-12-13 DIAGNOSIS — G56.03 BILATERAL CARPAL TUNNEL SYNDROME: ICD-10-CM

## 2019-12-13 DIAGNOSIS — E66.9 OBESITY (BMI 30-39.9): ICD-10-CM

## 2019-12-13 DIAGNOSIS — J31.0 CHRONIC RHINITIS: ICD-10-CM

## 2019-12-13 DIAGNOSIS — E78.2 MIXED HYPERLIPIDEMIA: ICD-10-CM

## 2019-12-13 LAB
ALBUMIN SERPL-MCNC: 4.5 G/DL (ref 3.6–5.1)
ALBUMIN/GLOB SERPL: 1.7 {RATIO} (ref 1–2.5)
ALP SERPL-CCNC: 76 U/L (ref 33–130)
ALT 1742-6: 0 U/L (ref 0–32)
AST 1920-8: 8 U/L (ref 0–35)
BILIRUB SERPL-MCNC: 1.2 MG/DL (ref 0.2–1.2)
BUN SERPL-MCNC: 21 MG/DL (ref 7–25)
BUN/CREATININE RATIO: 17.9 (ref 6–22)
CALCIUM SERPL-MCNC: 9.1 MG/DL (ref 8.6–10.3)
CHLORIDE SERPLBLD-SCNC: 105.8 MMOL/L (ref 98–110)
CHOLEST SERPL-MCNC: 178 MG/DL (ref 0–199)
CHOLEST/HDLC SERPL: 2 {RATIO} (ref 0–5)
CO2 SERPL-SCNC: 26.5 MMOL/L (ref 20–32)
CREAT SERPL-MCNC: 1.17 MG/DL (ref 0.7–1.18)
GLOBULIN, CALCULATED - QUEST: 2.7 (ref 1.9–3.7)
GLUCOSE SERPL-MCNC: 112 MG/DL (ref 60–99)
HBA1C MFR BLD: 5.3 % (ref 4–7)
HDLC SERPL-MCNC: 72 MG/DL (ref 40–150)
LDLC SERPL CALC-MCNC: 94 MG/DL (ref 0–130)
POTASSIUM SERPL-SCNC: 4.63 MMOL/L (ref 3.5–5.3)
PROT SERPL-MCNC: 7.2 G/DL (ref 6.1–8.1)
SODIUM SERPL-SCNC: 143.5 MMOL/L (ref 135–146)
TRIGL SERPL-MCNC: 59 MG/DL (ref 0–149)

## 2019-12-13 PROCEDURE — 83036 HEMOGLOBIN GLYCOSYLATED A1C: CPT | Performed by: PHYSICIAN ASSISTANT

## 2019-12-13 PROCEDURE — 80061 LIPID PANEL: CPT | Performed by: PHYSICIAN ASSISTANT

## 2019-12-13 PROCEDURE — 80053 COMPREHEN METABOLIC PANEL: CPT | Performed by: PHYSICIAN ASSISTANT

## 2019-12-13 PROCEDURE — 36415 COLL VENOUS BLD VENIPUNCTURE: CPT | Performed by: PHYSICIAN ASSISTANT

## 2019-12-13 PROCEDURE — 99396 PREV VISIT EST AGE 40-64: CPT | Performed by: PHYSICIAN ASSISTANT

## 2019-12-13 RX ORDER — FLUOXETINE 10 MG/1
10 TABLET, FILM COATED ORAL DAILY
Qty: 30 TABLET | Refills: 1 | Status: SHIPPED | OUTPATIENT
Start: 2019-12-13 | End: 2020-03-26

## 2019-12-13 RX ORDER — SIMVASTATIN 40 MG
40 TABLET ORAL DAILY
Qty: 90 TABLET | Refills: 3 | Status: SHIPPED | OUTPATIENT
Start: 2019-12-13 | End: 2020-12-22

## 2019-12-13 RX ORDER — SILDENAFIL 50 MG/1
50 TABLET, FILM COATED ORAL DAILY PRN
Qty: 30 TABLET | Refills: 6 | Status: CANCELLED | OUTPATIENT
Start: 2019-12-13

## 2019-12-13 ASSESSMENT — PATIENT HEALTH QUESTIONNAIRE - PHQ9
5. POOR APPETITE OR OVEREATING: NOT AT ALL
SUM OF ALL RESPONSES TO PHQ QUESTIONS 1-9: 3

## 2019-12-13 ASSESSMENT — ANXIETY QUESTIONNAIRES
5. BEING SO RESTLESS THAT IT IS HARD TO SIT STILL: NOT AT ALL
GAD7 TOTAL SCORE: 2
6. BECOMING EASILY ANNOYED OR IRRITABLE: MORE THAN HALF THE DAYS
7. FEELING AFRAID AS IF SOMETHING AWFUL MIGHT HAPPEN: NOT AT ALL
1. FEELING NERVOUS, ANXIOUS, OR ON EDGE: NOT AT ALL
2. NOT BEING ABLE TO STOP OR CONTROL WORRYING: NOT AT ALL
3. WORRYING TOO MUCH ABOUT DIFFERENT THINGS: NOT AT ALL
IF YOU CHECKED OFF ANY PROBLEMS ON THIS QUESTIONNAIRE, HOW DIFFICULT HAVE THESE PROBLEMS MADE IT FOR YOU TO DO YOUR WORK, TAKE CARE OF THINGS AT HOME, OR GET ALONG WITH OTHER PEOPLE: NOT DIFFICULT AT ALL

## 2019-12-13 ASSESSMENT — MIFFLIN-ST. JEOR: SCORE: 2045.75

## 2019-12-13 NOTE — NURSING NOTE
Kristopher is here for a fasting CPX.          Patient is here for a full physical exam.    Pre-Visit Screening :  Immunizations : up to date  Colon Screening : is up to date  Mammogram: NA  Asthma Action Test/Plan : NA  PHQ9 :  None  GAD7 :  None  Patient's  BMI Body mass index is 31.92 kg/m .  Questioned patient about current smoking habits.  Pt. has never smoked.  OK to leave a detailed voice message regarding today's visit Yes, phone # [phone]      ETOH screening:  Questions:  1-How often do you have a drink containing alcohol?                             1 times per week(s)  2-How many drinks containing alcohol do you have on a typical day when you are         Drinking?                              1 and 2   3- How often do you have 5 or more drinks on one occasion?                              never

## 2019-12-14 ASSESSMENT — ANXIETY QUESTIONNAIRES: GAD7 TOTAL SCORE: 2

## 2020-01-07 DIAGNOSIS — F43.21 GRIEF REACTION: ICD-10-CM

## 2020-01-07 RX ORDER — FLUOXETINE 10 MG/1
TABLET, FILM COATED ORAL
Qty: 30 TABLET | Refills: 1 | OUTPATIENT
Start: 2020-01-07

## 2020-01-07 NOTE — TELEPHONE ENCOUNTER
Refused Prescriptions:                       Disp   Refills    FLUoxetine (PROZAC) 10 MG tablet [Pharmacy*30 tab*1        Sig: TAKE 1 TABLET BY MOUTH EVERY DAY  Refused By: DONNA DE LA CRUZ  Reason for Refusal: Patient needs appointment    Per notes at  rtc in 6 weeks at    Due for non fasting ov  Donna  [phone] (home) [phone] (work)

## 2020-01-15 ENCOUNTER — MYC MEDICAL ADVICE (OUTPATIENT)
Dept: FAMILY MEDICINE | Facility: CLINIC | Age: 60
End: 2020-01-15

## 2020-02-08 DIAGNOSIS — F43.21 GRIEF REACTION: ICD-10-CM

## 2020-02-08 RX ORDER — FLUOXETINE 10 MG/1
TABLET, FILM COATED ORAL
Qty: 30 TABLET | Refills: 1 | OUTPATIENT
Start: 2020-02-08

## 2020-02-08 NOTE — TELEPHONE ENCOUNTER
Refused Prescriptions:                       Disp   Refills    FLUoxetine (PROZAC) 10 MG tablet [Pharmacy*30 tab*1        Sig: TAKE 1 TABLET BY MOUTH EVERY DAY  Refused By: OLIVER DE LA CRUZ  Reason for Refusal: Patient needs appointment    Per  Notes on at  on 12- rtc in 6 weeks for a follow up.   No Future Appt's for pt   Sent my chart message

## 2020-03-22 DIAGNOSIS — F43.21 GRIEF REACTION: ICD-10-CM

## 2020-03-23 RX ORDER — FLUOXETINE 10 MG/1
TABLET, FILM COATED ORAL
Qty: 30 TABLET | Refills: 0 | OUTPATIENT
Start: 2020-03-23

## 2020-03-23 NOTE — TELEPHONE ENCOUNTER
Pt last seen 12/13/19. Please advise.    Kristopher Gomez is requesting a refill of:    Pending Prescriptions:                       Disp   Refills    FLUoxetine (PROZAC) 10 MG tablet [Pharmac*30 tab*0            Sig: TAKE 1 TABLET BY MOUTH EVERY DAY

## 2020-03-26 ENCOUNTER — OFFICE VISIT (OUTPATIENT)
Dept: FAMILY MEDICINE | Facility: CLINIC | Age: 60
End: 2020-03-26

## 2020-03-26 DIAGNOSIS — F43.21 GRIEF REACTION: Primary | ICD-10-CM

## 2020-03-26 PROCEDURE — 99213 OFFICE O/P EST LOW 20 MIN: CPT | Mod: 95 | Performed by: PHYSICIAN ASSISTANT

## 2020-03-26 RX ORDER — FLUOXETINE 10 MG/1
10 TABLET, FILM COATED ORAL DAILY
Qty: 90 TABLET | Refills: 3 | Status: SHIPPED | OUTPATIENT
Start: 2020-03-26 | End: 2020-08-18

## 2020-03-26 ASSESSMENT — ANXIETY QUESTIONNAIRES
2. NOT BEING ABLE TO STOP OR CONTROL WORRYING: NOT AT ALL
1. FEELING NERVOUS, ANXIOUS, OR ON EDGE: NOT AT ALL
6. BECOMING EASILY ANNOYED OR IRRITABLE: SEVERAL DAYS
IF YOU CHECKED OFF ANY PROBLEMS ON THIS QUESTIONNAIRE, HOW DIFFICULT HAVE THESE PROBLEMS MADE IT FOR YOU TO DO YOUR WORK, TAKE CARE OF THINGS AT HOME, OR GET ALONG WITH OTHER PEOPLE: NOT DIFFICULT AT ALL
3. WORRYING TOO MUCH ABOUT DIFFERENT THINGS: NOT AT ALL
GAD7 TOTAL SCORE: 1
5. BEING SO RESTLESS THAT IT IS HARD TO SIT STILL: NOT AT ALL
7. FEELING AFRAID AS IF SOMETHING AWFUL MIGHT HAPPEN: NOT AT ALL

## 2020-03-26 ASSESSMENT — PATIENT HEALTH QUESTIONNAIRE - PHQ9
5. POOR APPETITE OR OVEREATING: NOT AT ALL
SUM OF ALL RESPONSES TO PHQ QUESTIONS 1-9: 0

## 2020-03-26 NOTE — PROGRESS NOTES
"Subjective    -  Kristopher Gomez is being evaluated via a billable video visit.      The patient has been notified of following:     \"This video visit will be conducted via a call between you and your physician/provider. We have found that certain health care needs can be provided without the need for an in-person physical exam.  This service lets us provide the care you need with a video conversation.  If a prescription is necessary we can send it directly to your pharmacy.  If lab work is needed we can place an order for that and you can then stop by our lab to have the test done at a later time.    If during the course of the call the physician/provider feels a video visit is not appropriate, you will not be charged for this service.\"     Physician has received verbal consent for a Video Visit from the patient? Yes    Patient would like the video invitation sent by: Send to e-mail at: [email or web address]      This visit was completed via telemedicine using Zoom.  Patient and I completed history, ROS, and HPI via virtual encounter with video/sound.      Time Visit Started:  1:40 PM    Time Visit Ended:  1:56 PM        Kristopher Gomez is a 59 year old male who presents for the following health issues:    HPI         Follow-up on grief reaction  Started on Prozac after loss of friend to ALS      Stayed on 10 mg tablet instead of going on 20mg   Had 5 days off upon running out of medication  Within 3 days, tearing started again so he restarted medication and sx improved    Didn't start counseling  Did group grief group    Denies SI    PHQ-9 SCORE 12/13/2019 3/26/2020   PHQ-9 Total Score 3 0         STEVAN-7 SCORE 12/13/2019 3/26/2020   Total Score 2 1               Patient Active Problem List   Diagnosis     Mixed hyperlipidemia     Family history of cardiovascular disease     ACP (advance care planning)     Health Care Home     Chronic rhinitis     Erectile dysfunction, unspecified erectile dysfunction type     Past " Surgical History:   Procedure Laterality Date     AS TOTAL KNEE ARTHROPLASTY Right 2015     C TOTAL KNEE ARTHROPLASTY Left 2019     HC FEMUR/KNEE SURG UNLISTED Right 1980    debridement of meniscal tear     HC VASECTOMY UNILAT/BILAT W POSTOP SEMEN      Vasectomy     HERNIORRHAPHY INGUINAL Right 11/3/2017    Procedure: HERNIORRHAPHY INGUINAL;  Right Inguinal Hernia Repair with Mesh ;  Surgeon: Reginaldo Bearden MD;  Location: [facility] OR       Social History     Tobacco Use     Smoking status: Never Smoker     Smokeless tobacco: Never Used   Substance Use Topics     Alcohol use: Yes     Alcohol/week: 1.7 standard drinks     Types: 2 Standard drinks or equivalent per week     Frequency: 2-4 times a month     Drinks per session: 1 or 2     Binge frequency: Never     Family History   Problem Relation Age of Onset     Cancer Mother 70        peritoneal     Heart Disease Father         MI x 10, cab x 2--ages 36 and 44     Diabetes Father      Coronary Artery Disease Father      Hyperlipidemia Father      Other - See Comments Sister         prediabetes     Coronary Artery Disease Paternal Grandfather      Cancer - colorectal No family hx of      Prostate Cancer No family hx of            Current Outpatient Medications   Medication Sig Dispense Refill     FLUoxetine (PROZAC) 10 MG tablet Take 1 tablet (10 mg) by mouth daily 90 tablet 3     aspirin 81 MG tablet Take 81 mg by mouth daily        celecoxib (CELEBREX) 200 MG capsule Take 1 capsule (200 mg) by mouth 2 times daily as needed for moderate pain 60 capsule 1     Cetirizine HCl (ZYRTEC ALLERGY PO)        fluticasone (FLONASE) 50 MCG/ACT nasal spray Spray 1-2 sprays into both nostrils daily (Patient taking differently: Spray 1-2 sprays into both nostrils daily as needed ) 16 g 1     sildenafil (VIAGRA) 50 MG tablet Take 1 tablet (50 mg) by mouth daily as needed (as needed) 30 tablet 6     simvastatin (ZOCOR) 40 MG tablet Take 1 tablet (40 mg) by mouth daily 90 tablet 3      Allergies   Allergen Reactions     No Known Allergies      Recent Labs   Lab Test 12/13/19  1417 12/13/19 12/21/18  1139 12/21/18  1111 10/06/17  1117 09/02/16  0835 02/27/16  2227   A1C 5.3  --   --  5.6  --   --   --    LDL  --  94 115*  --  110* 109  --    HDL  --  72 55  --  61 52  --    TRIG  --  59 121  --  75 114  --    ALT  --   --  8*  --  8* 11  --    CR  --  1.17 1.08  --  1.13 1.12  --    GFRESTIMATED  --   --  76  --  73 75 63   GFRESTBLACK  --   --   --   --   --   --  77   POTASSIUM  --  4.63 4.1  --  4.5 4.8 3.6        BP Readings from Last 3 Encounters:   12/13/19 116/78   05/03/19 108/68   12/21/18 130/72    Wt Readings from Last 3 Encounters:   12/13/19 114.3 kg (252 lb)   05/03/19 114.9 kg (253 lb 6.4 oz)   12/21/18 118.2 kg (260 lb 9.6 oz)                    -------------------------------------  Reviewed and updated as needed this visit by Provider         Review of Systems   ROS COMP: Constitutional, HEENT, cardiovascular, pulmonary, gi and gu systems are negative, except as otherwise noted.      Objective    There were no vitals taken for this visit.  There is no height or weight on file to calculate BMI.    Physical Exam       There was no physical examination done due to telemedicine appointment.       Mental Status Exam:   Appearance: calm  Grooming: adequately groomed  Demeanor: engaged, cooperative  Affect: normal  Speech: Normal.  Gait:Normal.  Movements: Normal  Form of thought: Logical, Linear and Goal directed  Thought content:  Normal  Insight:Good   Judgment: Good   Cognition: Good         Assessment & Plan     1. Grief reaction  Continue 6-12 months   Call/send mychart if want to wean off  - FLUoxetine (PROZAC) 10 MG tablet; Take 1 tablet (10 mg) by mouth daily  Dispense: 90 tablet; Refill: 3       Follow-up at [facility] in the fall      [facility]  [facility]    Virtual Visit Coding: Established Patient  22516 = 10 min  86718 = 15 min  24217 =  25 min  17016 = 40 min

## 2020-03-27 ASSESSMENT — ANXIETY QUESTIONNAIRES: GAD7 TOTAL SCORE: 1

## 2020-08-18 ENCOUNTER — TELEPHONE (OUTPATIENT)
Dept: FAMILY MEDICINE | Facility: CLINIC | Age: 60
End: 2020-08-18

## 2020-08-18 DIAGNOSIS — F43.21 GRIEF REACTION: Primary | ICD-10-CM

## 2020-08-18 RX ORDER — FLUOXETINE 10 MG/1
10 CAPSULE ORAL DAILY
Qty: 90 CAPSULE | Refills: 1 | Status: SHIPPED | OUTPATIENT
Start: 2020-08-18 | End: 2021-01-22

## 2020-08-18 NOTE — TELEPHONE ENCOUNTER
Kristopher Gomez is requesting a refill of:    Pending Prescriptions:                       Disp   Refills    FLUoxetine (PROZAC) 10 MG capsule         90 cap*1            Sig: Take 1 capsule (10 mg) by mouth daily    The capsule is on the formulary for his insurance.    Please advise  Thanks,Riri

## 2020-08-18 NOTE — TELEPHONE ENCOUNTER
PHQ-9 SCORE 12/13/2019 3/26/2020   PHQ-9 Total Score 3 0         STEVNA-7 SCORE 12/13/2019 3/26/2020   Total Score 2 1           Signed for 6 months    Opal Tariq PA-C  8/18/2020

## 2020-12-22 DIAGNOSIS — E78.2 MIXED HYPERLIPIDEMIA: ICD-10-CM

## 2020-12-22 RX ORDER — SIMVASTATIN 40 MG
40 TABLET ORAL DAILY
Qty: 30 TABLET | Refills: 0 | COMMUNITY
Start: 2020-12-22 | End: 2021-01-22

## 2020-12-22 RX ORDER — SIMVASTATIN 40 MG
TABLET ORAL
Qty: 90 TABLET | Refills: 3 | COMMUNITY
Start: 2020-12-22

## 2020-12-22 NOTE — TELEPHONE ENCOUNTER
Received incoming refill request for  Pending Prescriptions:                       Disp   Refills    simvastatin (ZOCOR) 40 MG tablet [Pharmac*90 tab*3            Sig: TAKE 1 TABLET BY MOUTH EVERY DAY    Patient last had a refill of this medication on 12/13/19 for a one year supply. They are now due for an OV with labs for refills of this medication. This is being denied as there is no appt scheduled. Sourcery message was sent to the patient informing him to call and schedule an appt.

## 2020-12-22 NOTE — TELEPHONE ENCOUNTER
Pt called asking for short supply of medication. Has OV 1/22/2021 with [facility].    Signed Prescriptions:                        Disp   Refills    simvastatin (ZOCOR) 40 MG tablet           30 tab*0        Sig: Take 1 tablet (40 mg) by mouth daily  Authorizing Provider: APRIL WHITING  Ordering User: LEXIE EDMONDS

## 2021-01-14 DIAGNOSIS — E78.2 MIXED HYPERLIPIDEMIA: ICD-10-CM

## 2021-01-14 RX ORDER — SIMVASTATIN 40 MG
TABLET ORAL
Qty: 30 TABLET | Refills: 0 | COMMUNITY
Start: 2021-01-14

## 2021-01-14 NOTE — TELEPHONE ENCOUNTER
Pt has appt 1/22/2021. Will fill at the appt. Given a short supply 12/22/2020.    Refused Prescriptions:                       Disp   Refills    simvastatin (ZOCOR) 40 MG tablet [Pharmacy*30 tab*0        Sig: TAKE 1 TABLET BY MOUTH EVERY DAY  Refused By: LEXIE ROCHA  Reason for Refusal: Patient needs appointment  Reason for Refusal Comment: will fill at appt.

## 2021-01-16 ENCOUNTER — HEALTH MAINTENANCE LETTER (OUTPATIENT)
Age: 61
End: 2021-01-16

## 2021-01-19 NOTE — PROGRESS NOTES
3  SUBJECTIVE:   CC: Kristopher Gomez is an 60 year old male who presents for preventive health visit.       Patient has been advised of split billing requirements and indicates understanding: Yes     Healthy Habits:    Do you get at least three servings of calcium containing foods daily (dairy, green leafy vegetables, etc.)? yes    Amount of exercise or daily activities, outside of work: exercising 3-5 days a week    Problems taking medications regularly No    Medication side effects: No    Have you had an eye exam in the past two years? no    Do you see a dentist twice per year? yes    Do you have sleep apnea, excessive snoring or daytime drowsiness?no      Started on Prozac after death of friend last year  Doing well on 10 mg  No AE  Did notice when off for a few days increased emotions, would like to stay on for now.     PHQ-9 SCORE 12/13/2019 3/26/2020 1/22/2021   PHQ-9 Total Score 3 0 0         STEVAN-7 SCORE 12/13/2019 3/26/2020 1/22/2021   Total Score 2 1 0           Celebrex use  - no longer using  PRN Ibuprofen    Allergies - seasonal  - mold in spring, September  Taking allergy med every day  Post nasal drainage      ED - No AE from Viagra.     Wt Readings from Last 5 Encounters:   01/22/21 112.4 kg (247 lb 11.2 oz)   12/13/19 114.3 kg (252 lb)   05/03/19 114.9 kg (253 lb 6.4 oz)   12/21/18 118.2 kg (260 lb 9.6 oz)   11/21/17 118.8 kg (262 lb)           Hyperlipidemia Follow-Up      Are you regularly taking any medication or supplement to lower your cholesterol?   Yes- simvastatin 40 mg    Are you having muscle aches or other side effects that you think could be caused by your cholesterol lowering medication?  No      Today's PHQ-2 Score:   PHQ-2 ( 1999 Pfizer) 5/3/2019 12/21/2018   Q1: Little interest or pleasure in doing things 0 0   Q2: Feeling down, depressed or hopeless 0 0   PHQ-2 Score 0 0       Abuse: Current or Past(Physical, Sexual or Emotional)- No  Do you feel safe in your environment?  Yes        Social History     Tobacco Use     Smoking status: Never Smoker     Smokeless tobacco: Never Used   Substance Use Topics     Alcohol use: Yes     Alcohol/week: 1.7 standard drinks     Types: 2 Standard drinks or equivalent per week     Frequency: 2-4 times a month     Drinks per session: 1 or 2     Binge frequency: Never     If you drink alcohol do you typically have >3 drinks per day or >7 drinks per week? No                      Last PSA:   Abbott PSA   Date Value Ref Range Status   12/21/2018 0.8 < OR = 4.0 ng/mL Final     Comment:     The total PSA value from this assay system is   standardized against the WHO standard. The test   result will be approximately 20% lower when compared   to the equimolar-standardized total PSA (Arturo   Mary). Comparison of serial PSA results should be   interpreted with this fact in mind.     This test was performed using the Siemens   chemiluminescent method. Values obtained from   different assay methods cannot be used  interchangeably. PSA levels, regardless of  value, should not be interpreted as absolute  evidence of the presence or absence of disease.         Reviewed orders with patient. Reviewed health maintenance and updated orders accordingly - Yes  Lab work is in process  Labs reviewed in EPIC  BP Readings from Last 3 Encounters:   01/22/21 126/74   12/13/19 116/78   05/03/19 108/68    Wt Readings from Last 3 Encounters:   01/22/21 112.4 kg (247 lb 11.2 oz)   12/13/19 114.3 kg (252 lb)   05/03/19 114.9 kg (253 lb 6.4 oz)                  Patient Active Problem List   Diagnosis     Mixed hyperlipidemia     ACP (advance care planning)     Health Care Home     Chronic rhinitis     Erectile dysfunction, unspecified erectile dysfunction type     Grief reaction     Elevated fasting glucose     Past Surgical History:   Procedure Laterality Date     AS TOTAL KNEE ARTHROPLASTY Right 2015     C TOTAL KNEE ARTHROPLASTY Left 2019     CARPAL TUNNEL RELEASE RT/LT Left  2020     HC FEMUR/KNEE SURG UNLISTED Right 1980    debridement of meniscal tear     HC VASECTOMY UNILAT/BILAT W POSTOP SEMEN      Vasectomy     HERNIORRHAPHY INGUINAL Right 11/03/2017    Procedure: HERNIORRHAPHY INGUINAL;  Right Inguinal Hernia Repair with Mesh ;  Surgeon: Reginaldo Bearden MD;  Location: [facility] OR       Social History     Tobacco Use     Smoking status: Never Smoker     Smokeless tobacco: Never Used   Substance Use Topics     Alcohol use: Yes     Alcohol/week: 1.7 standard drinks     Types: 2 Standard drinks or equivalent per week     Frequency: 2-4 times a month     Drinks per session: 1 or 2     Binge frequency: Never     Family History   Problem Relation Age of Onset     Cancer Mother 70        peritoneal     Heart Disease Father         MI x 10, cab x 2--ages 36 and 44     Diabetes Father      Coronary Artery Disease Father      Hyperlipidemia Father      Other - See Comments Sister         prediabetes     Coronary Artery Disease Paternal Grandfather      Cancer - colorectal No family hx of      Prostate Cancer No family hx of          Current Outpatient Medications   Medication Sig Dispense Refill     aspirin 81 MG tablet Take 81 mg by mouth daily        Cetirizine HCl (ZYRTEC ALLERGY PO)        FLUoxetine (PROZAC) 10 MG capsule Take 1 capsule (10 mg) by mouth daily 90 capsule 3     sildenafil (VIAGRA) 50 MG tablet Take 1 tablet (50 mg) by mouth daily as needed (take 30 min prior to intercourse) 30 tablet 4     simvastatin (ZOCOR) 40 MG tablet Take 1 tablet (40 mg) by mouth daily 90 tablet 3     Allergies   Allergen Reactions     No Known Allergies      Recent Labs   Lab Test 12/13/19  1417 12/13/19 12/21/18  1139 12/21/18  1111 10/06/17  1117 09/02/16  0835 02/27/16  2227   A1C 5.3  --   --  5.6  --   --   --    LDL  --  94 115*  --  110* 109  --    HDL  --  72 55  --  61 52  --    TRIG  --  59 121  --  75 114  --    ALT  --   --  8*  --  8* 11  --    CR  --  1.17 1.08  --  1.13 1.12  --     GFRESTIMATED  --   --  76  --  73 75 63   GFRESTBLACK  --   --   --   --   --   --  77   POTASSIUM  --  4.63 4.1  --  4.5 4.8 3.6        Reviewed and updated as needed this visit by clinical staff  Tobacco  Allergies  Meds  Problems  Med Hx  Surg Hx           Reviewed and updated as needed this visit by Provider                Past Medical History:   Diagnosis Date     Family history of cardiovascular disease 5/18/2012     Mixed hyperlipidemia 5/23/2003     Other internal derangement of knee(717.89) 1980    right medial meniscus tear s/p arthroscopy, debridement      Past Surgical History:   Procedure Laterality Date     AS TOTAL KNEE ARTHROPLASTY Right 2015     C TOTAL KNEE ARTHROPLASTY Left 2019     CARPAL TUNNEL RELEASE RT/LT Left 2020     HC FEMUR/KNEE SURG UNLISTED Right 1980    debridement of meniscal tear     HC VASECTOMY UNILAT/BILAT W POSTOP SEMEN      Vasectomy     HERNIORRHAPHY INGUINAL Right 11/03/2017    Procedure: HERNIORRHAPHY INGUINAL;  Right Inguinal Hernia Repair with Mesh ;  Surgeon: Reginaldo Bearden MD;  Location: [facility] OR       ROS:  CONSTITUTIONAL: NEGATIVE for fever, chills, change in weight  INTEGUMENTARY/SKIN: NEGATIVE for worrisome rashes, moles or lesions  EYES: NEGATIVE for vision changes or irritation  ENT: NEGATIVE for ear, mouth and throat problems  RESP: NEGATIVE for significant cough or SOB  CV: NEGATIVE for chest pain, palpitations or peripheral edema  GI: NEGATIVE for nausea, abdominal pain, heartburn, or change in bowel habits   male: dysuria, hematuria and frequency  MUSCULOSKELETAL: NEGATIVE for significant arthralgias or myalgia  NEURO: NEGATIVE for weakness, dizziness or paresthesias  PSYCHIATRIC: NEGATIVE for changes in mood or affect    OBJECTIVE:   /74 (BP Location: Right arm, Patient Position: Chair, Cuff Size: Adult Regular)   Pulse 64   Temp 97.8  F (36.6  C)   Resp 20   Wt 112.4 kg (247 lb 11.2 oz)   BMI 31.38 kg/m    EXAM:  GENERAL: healthy, alert and  no distress  EYES: Eyes grossly normal to inspection, PERRL and conjunctivae and sclerae normal  HENT: ear canals and TM's normal, nose and mouth without ulcers or lesions  NECK: no adenopathy, no asymmetry, masses, or scars and thyroid normal to palpation  RESP: lungs clear to auscultation - no rales, rhonchi or wheezes  CV: regular rate and rhythm, normal S1 S2, no S3 or S4, no murmur, click or rub, no peripheral edema and peripheral pulses strong  ABDOMEN: soft, nontender, no hepatosplenomegaly, no masses and bowel sounds normal  MS: no gross musculoskeletal defects noted, no edema  SKIN: no suspicious lesions or rashes  NEURO: Normal strength and tone, mentation intact and speech normal      Mental Status Exam:   Appearance: calm  Grooming: adequately groomed  Demeanor: engaged, cooperative  Affect: normal  Speech: Normal.  Gait:Normal.  Movements: Normal  Form of thought: Logical, Linear and Goal directed  Thought content:  Normal  Insight:Good   Judgment: Good   Cognition: Good         ASSESSMENT/PLAN:   1. Routine general medical examination at a health care facility    - VENOUS COLLECTION  - Lipid Panel (BFP)  - Comprehensive Metobolic Panel (BFP)    2. Mixed hyperlipidemia  Controlled.   Continue current medication(s) at current dose(s).    - simvastatin (ZOCOR) 40 MG tablet; Take 1 tablet (40 mg) by mouth daily  Dispense: 90 tablet; Refill: 3  - VENOUS COLLECTION  - Lipid Panel (BFP)  - Comprehensive Metobolic Panel (BFP)    3. Erectile dysfunction, unspecified erectile dysfunction type    - sildenafil (VIAGRA) 50 MG tablet; Take 1 tablet (50 mg) by mouth daily as needed (take 30 min prior to intercourse)  Dispense: 30 tablet; Refill: 4    4. Chronic rhinitis  Continue OTC antihistamine    5. Grief reaction  Controlled.   Continue current medication(s) at current dose(s).    - FLUoxetine (PROZAC) 10 MG capsule; Take 1 capsule (10 mg) by mouth daily  Dispense: 90 capsule; Refill: 3    6. Elevated fasting  "glucose    - VENOUS COLLECTION  - Comprehensive Metobolic Panel (BFP)    7. Obesity    Patient has been advised of split billing requirements and indicates understanding: Yes  COUNSELING:  Special attention given to:        Regular exercise       Healthy diet/nutrition     COLONOSCOPY DUE 2022    Estimated body mass index is 31.38 kg/m  as calculated from the following:    Height as of 12/13/19: 1.892 m (6' 2.5\").    Weight as of this encounter: 112.4 kg (247 lb 11.2 oz).    Weight management plan: Discussed healthy diet and exercise guidelines    He reports that he has never smoked. He has never used smokeless tobacco.      Counseling Resources:  ATP IV Guidelines  Pooled Cohorts Equation Calculator  FRAX Risk Assessment  ICSI Preventive Guidelines  Dietary Guidelines for Americans, 2010  USDA's MyPlate  ASA Prophylaxis  Lung CA Screening    [facility]  [place] [facility]  "

## 2021-01-22 ENCOUNTER — OFFICE VISIT (OUTPATIENT)
Dept: FAMILY MEDICINE | Facility: CLINIC | Age: 61
End: 2021-01-22

## 2021-01-22 VITALS
SYSTOLIC BLOOD PRESSURE: 126 MMHG | DIASTOLIC BLOOD PRESSURE: 74 MMHG | RESPIRATION RATE: 20 BRPM | TEMPERATURE: 97.8 F | HEART RATE: 64 BPM | BODY MASS INDEX: 31.38 KG/M2 | WEIGHT: 247.7 LBS

## 2021-01-22 DIAGNOSIS — F43.21 GRIEF REACTION: ICD-10-CM

## 2021-01-22 DIAGNOSIS — Z86.0100 HISTORY OF COLONIC POLYPS: ICD-10-CM

## 2021-01-22 DIAGNOSIS — J31.0 CHRONIC RHINITIS: ICD-10-CM

## 2021-01-22 DIAGNOSIS — R73.01 ELEVATED FASTING GLUCOSE: ICD-10-CM

## 2021-01-22 DIAGNOSIS — Z00.00 ROUTINE GENERAL MEDICAL EXAMINATION AT A HEALTH CARE FACILITY: Primary | ICD-10-CM

## 2021-01-22 DIAGNOSIS — E78.2 MIXED HYPERLIPIDEMIA: ICD-10-CM

## 2021-01-22 DIAGNOSIS — E66.811 OBESITY (BMI 30.0-34.9): ICD-10-CM

## 2021-01-22 DIAGNOSIS — N52.9 ERECTILE DYSFUNCTION, UNSPECIFIED ERECTILE DYSFUNCTION TYPE: ICD-10-CM

## 2021-01-22 PROBLEM — F43.20 GRIEF REACTION: Status: ACTIVE | Noted: 2021-01-22

## 2021-01-22 LAB
ALBUMIN SERPL-MCNC: 3.9 G/DL (ref 3.6–5.1)
ALBUMIN/GLOB SERPL: 1.6 {RATIO} (ref 1–2.5)
ALP SERPL-CCNC: 75 U/L (ref 33–130)
ALT 1742-6: 6 U/L (ref 0–32)
AST 1920-8: 12 U/L (ref 0–35)
BILIRUB SERPL-MCNC: 0.7 MG/DL (ref 0.2–1.2)
BUN SERPL-MCNC: 23 MG/DL (ref 7–25)
BUN/CREATININE RATIO: 19.2 (ref 6–22)
CALCIUM SERPL-MCNC: 9.3 MG/DL (ref 8.6–10.3)
CHLORIDE SERPLBLD-SCNC: 106.2 MMOL/L (ref 98–110)
CHOLEST SERPL-MCNC: 176 MG/DL (ref 0–199)
CHOLEST/HDLC SERPL: 3 {RATIO} (ref 0–5)
CO2 SERPL-SCNC: 27.9 MMOL/L (ref 20–32)
CREAT SERPL-MCNC: 1.2 MG/DL (ref 0.6–1.3)
GLOBULIN, CALCULATED - QUEST: 2.4 (ref 1.9–3.7)
GLUCOSE SERPL-MCNC: 113 MG/DL (ref 60–99)
HBA1C MFR BLD: 5.1 % (ref 4–7)
HDLC SERPL-MCNC: 59 MG/DL (ref 40–150)
LDLC SERPL CALC-MCNC: 96 MG/DL (ref 0–130)
POTASSIUM SERPL-SCNC: 4.52 MMOL/L (ref 3.5–5.3)
PROT SERPL-MCNC: 6.3 G/DL (ref 6.1–8.1)
SODIUM SERPL-SCNC: 139.8 MMOL/L (ref 135–146)
TRIGL SERPL-MCNC: 105 MG/DL (ref 0–149)

## 2021-01-22 PROCEDURE — 36415 COLL VENOUS BLD VENIPUNCTURE: CPT | Performed by: PHYSICIAN ASSISTANT

## 2021-01-22 PROCEDURE — 80053 COMPREHEN METABOLIC PANEL: CPT | Performed by: PHYSICIAN ASSISTANT

## 2021-01-22 PROCEDURE — 83036 HEMOGLOBIN GLYCOSYLATED A1C: CPT | Performed by: PHYSICIAN ASSISTANT

## 2021-01-22 PROCEDURE — 80061 LIPID PANEL: CPT | Performed by: PHYSICIAN ASSISTANT

## 2021-01-22 PROCEDURE — 99396 PREV VISIT EST AGE 40-64: CPT | Performed by: PHYSICIAN ASSISTANT

## 2021-01-22 RX ORDER — SILDENAFIL 50 MG/1
50 TABLET, FILM COATED ORAL DAILY PRN
Qty: 30 TABLET | Refills: 4 | Status: SHIPPED | OUTPATIENT
Start: 2021-01-22 | End: 2022-02-25

## 2021-01-22 RX ORDER — FLUOXETINE 10 MG/1
10 CAPSULE ORAL DAILY
Qty: 90 CAPSULE | Refills: 3 | Status: SHIPPED | OUTPATIENT
Start: 2021-01-22 | End: 2022-02-25

## 2021-01-22 RX ORDER — SIMVASTATIN 40 MG
40 TABLET ORAL DAILY
Qty: 90 TABLET | Refills: 3 | Status: SHIPPED | OUTPATIENT
Start: 2021-01-22 | End: 2022-02-11

## 2021-01-22 ASSESSMENT — ANXIETY QUESTIONNAIRES
GAD7 TOTAL SCORE: 0
7. FEELING AFRAID AS IF SOMETHING AWFUL MIGHT HAPPEN: NOT AT ALL
6. BECOMING EASILY ANNOYED OR IRRITABLE: NOT AT ALL
5. BEING SO RESTLESS THAT IT IS HARD TO SIT STILL: NOT AT ALL
2. NOT BEING ABLE TO STOP OR CONTROL WORRYING: NOT AT ALL
1. FEELING NERVOUS, ANXIOUS, OR ON EDGE: NOT AT ALL
3. WORRYING TOO MUCH ABOUT DIFFERENT THINGS: NOT AT ALL

## 2021-01-22 ASSESSMENT — PATIENT HEALTH QUESTIONNAIRE - PHQ9
5. POOR APPETITE OR OVEREATING: NOT AT ALL
SUM OF ALL RESPONSES TO PHQ QUESTIONS 1-9: 0

## 2021-01-22 NOTE — NURSING NOTE
Kristopher Gomez is here for a CPX.    Pre-visit planning  Immunizations -up to date  Colonoscopy -is up to date  Mammogram -  Asthma test --  PHQ9 -  STEVAN 7 -    Questioned patient about current smoking habits.  Pt. has never smoked.  Body mass index is 31.38 kg/m .  PULSE reg  My Chart: active  CLASSIFICATION OF OVERWEIGHT AND OBESITY BY BMI                        Obesity Class           BMI(kg/m2)  Underweight                                    < 18.5  Normal                                         18.5-24.9  Overweight                                     25.0-29.9  OBESITY                     I                  30.0-34.9                             II                 35.0-39.9  EXTREME OBESITY             III                >40                            Patient's  BMI Body mass index is 31.38 kg/m .  The patient has verbalized that it is ok to leave a detailed voice message on the patient's cell phone with results/recommendations from this visit.   [phone]

## 2021-01-23 ASSESSMENT — ANXIETY QUESTIONNAIRES: GAD7 TOTAL SCORE: 0

## 2021-02-11 ENCOUNTER — TRANSFERRED RECORDS (OUTPATIENT)
Dept: FAMILY MEDICINE | Facility: CLINIC | Age: 61
End: 2021-02-11

## 2021-10-23 ENCOUNTER — HEALTH MAINTENANCE LETTER (OUTPATIENT)
Age: 61
End: 2021-10-23

## 2022-01-19 DIAGNOSIS — E78.2 MIXED HYPERLIPIDEMIA: ICD-10-CM

## 2022-01-19 RX ORDER — SIMVASTATIN 40 MG
TABLET ORAL
Qty: 90 TABLET | Refills: 0 | COMMUNITY
Start: 2022-01-19

## 2022-01-19 NOTE — TELEPHONE ENCOUNTER
Kristopher Gomez is requesting a refill of:    Refused Prescriptions:                       Disp   Refills    simvastatin (ZOCOR) 40 MG tablet [Pharmacy*90 tab*0        Sig: TAKE ONE TABLET BY MOUTH ONE TIME DAILY  Refused By: LESTER CRUZ  Reason for Refusal: Patient needs appointment    Pt last seen 01/22/21, due for yearly FASTING OV. [facility]

## 2022-02-11 RX ORDER — SIMVASTATIN 40 MG
40 TABLET ORAL DAILY
Qty: 14 TABLET | Refills: 0 | COMMUNITY
Start: 2022-02-11 | End: 2022-02-25

## 2022-02-11 NOTE — TELEPHONE ENCOUNTER
Pt called and scheduled an appt for 2/24/2022. I called in a 14 day supply to [facility] pharmacy.      Signed Prescriptions:                        Disp   Refills    simvastatin (ZOCOR) 40 MG tablet           14 tab*0        Sig: Take 1 tablet (40 mg) by mouth daily  Authorizing Provider: KEITH COLVIN  Ordering User: MIKO KISER    Refused Prescriptions:                       Disp   Refills    simvastatin (ZOCOR) 40 MG tablet [Pharmacy*90 tab*0        Sig: TAKE ONE TABLET BY MOUTH ONE TIME DAILY  Refused By: LESTER CRUZ  Reason for Refusal: Patient needs appointment

## 2022-02-25 ENCOUNTER — OFFICE VISIT (OUTPATIENT)
Dept: FAMILY MEDICINE | Facility: CLINIC | Age: 62
End: 2022-02-25

## 2022-02-25 VITALS
TEMPERATURE: 97.6 F | HEART RATE: 60 BPM | BODY MASS INDEX: 33.32 KG/M2 | SYSTOLIC BLOOD PRESSURE: 110 MMHG | DIASTOLIC BLOOD PRESSURE: 76 MMHG | RESPIRATION RATE: 20 BRPM | WEIGHT: 263 LBS

## 2022-02-25 DIAGNOSIS — Z82.49 FAMILY HISTORY OF CORONARY ARTERY DISEASE IN FATHER: ICD-10-CM

## 2022-02-25 DIAGNOSIS — N52.9 ERECTILE DYSFUNCTION, UNSPECIFIED ERECTILE DYSFUNCTION TYPE: ICD-10-CM

## 2022-02-25 DIAGNOSIS — F43.21 GRIEF REACTION: ICD-10-CM

## 2022-02-25 DIAGNOSIS — R73.01 ELEVATED FASTING GLUCOSE: ICD-10-CM

## 2022-02-25 DIAGNOSIS — Z00.00 ROUTINE GENERAL MEDICAL EXAMINATION AT A HEALTH CARE FACILITY: Primary | ICD-10-CM

## 2022-02-25 DIAGNOSIS — E66.811 OBESITY (BMI 30.0-34.9): ICD-10-CM

## 2022-02-25 DIAGNOSIS — D49.2 SKIN NEOPLASM: ICD-10-CM

## 2022-02-25 DIAGNOSIS — E78.2 MIXED HYPERLIPIDEMIA: ICD-10-CM

## 2022-02-25 LAB
ALBUMIN SERPL-MCNC: 4 G/DL (ref 3.6–5.1)
ALBUMIN/GLOB SERPL: 1.4 {RATIO} (ref 1–2.5)
ALP SERPL-CCNC: 76 U/L (ref 33–130)
ALT 1742-6: 7 U/L (ref 0–32)
AST 1920-8: 18 U/L (ref 0–35)
BILIRUB SERPL-MCNC: 1.1 MG/DL (ref 0.2–1.2)
BUN SERPL-MCNC: 16 MG/DL (ref 7–25)
BUN/CREATININE RATIO: 15.1 (ref 6–22)
CALCIUM SERPL-MCNC: 9.3 MG/DL (ref 8.6–10.3)
CHLORIDE SERPLBLD-SCNC: 102 MMOL/L (ref 98–110)
CHOLEST SERPL-MCNC: 173 MG/DL (ref 0–199)
CHOLEST/HDLC SERPL: 3 {RATIO} (ref 0–5)
CO2 SERPL-SCNC: 28.1 MMOL/L (ref 20–32)
CREAT SERPL-MCNC: 1.06 MG/DL (ref 0.6–1.3)
GLOBULIN, CALCULATED - QUEST: 2.9 (ref 1.9–3.7)
GLUCOSE SERPL-MCNC: 106 MG/DL (ref 60–99)
HDLC SERPL-MCNC: 62 MG/DL (ref 40–150)
LDLC SERPL CALC-MCNC: 94 MG/DL (ref 0–130)
POTASSIUM SERPL-SCNC: 4.48 MMOL/L (ref 3.5–5.3)
PROT SERPL-MCNC: 6.9 G/DL (ref 6.1–8.1)
SODIUM SERPL-SCNC: 138.9 MMOL/L (ref 135–146)
TRIGL SERPL-MCNC: 87 MG/DL (ref 0–149)

## 2022-02-25 PROCEDURE — 84153 ASSAY OF PSA TOTAL: CPT | Mod: 90 | Performed by: PHYSICIAN ASSISTANT

## 2022-02-25 PROCEDURE — 80053 COMPREHEN METABOLIC PANEL: CPT | Performed by: PHYSICIAN ASSISTANT

## 2022-02-25 PROCEDURE — 99396 PREV VISIT EST AGE 40-64: CPT | Performed by: PHYSICIAN ASSISTANT

## 2022-02-25 PROCEDURE — 80061 LIPID PANEL: CPT | Performed by: PHYSICIAN ASSISTANT

## 2022-02-25 PROCEDURE — 36415 COLL VENOUS BLD VENIPUNCTURE: CPT | Performed by: PHYSICIAN ASSISTANT

## 2022-02-25 RX ORDER — FLUOXETINE 10 MG/1
10 CAPSULE ORAL DAILY
Qty: 90 CAPSULE | Refills: 3 | Status: SHIPPED | OUTPATIENT
Start: 2022-02-25 | End: 2024-04-25

## 2022-02-25 RX ORDER — SILDENAFIL 50 MG/1
50 TABLET, FILM COATED ORAL DAILY PRN
Qty: 30 TABLET | Refills: 4 | Status: SHIPPED | OUTPATIENT
Start: 2022-02-25

## 2022-02-25 RX ORDER — SIMVASTATIN 40 MG
40 TABLET ORAL DAILY
Qty: 90 TABLET | Refills: 3 | Status: SHIPPED | OUTPATIENT
Start: 2022-02-25 | End: 2023-03-13

## 2022-02-25 ASSESSMENT — PATIENT HEALTH QUESTIONNAIRE - PHQ9: SUM OF ALL RESPONSES TO PHQ QUESTIONS 1-9: 0

## 2022-02-25 NOTE — PROGRESS NOTES
SUBJECTIVE:   CC: Kristopher Gomez is an 61 year old male who presents for preventative health visit.         Patient has been advised of split billing requirements and indicates understanding: Yes    HPI      Hyperlipidemia  - simvastatin  Father with CAD  Pt wondering about other screening tests for CAD    Prozac - sx well controlled - would like to continue on this.     Viagra - no AE    Zyrtec - year round  Nasal surgery 1980s   Drainage      Wt Readings from Last 5 Encounters:   02/25/22 119.3 kg (263 lb)   01/22/21 112.4 kg (247 lb 11.2 oz)   12/13/19 114.3 kg (252 lb)   05/03/19 114.9 kg (253 lb 6.4 oz)   12/21/18 118.2 kg (260 lb 9.6 oz)         Today's PHQ-2 Score:   PHQ-2 ( 1999 Pfizer) 2/25/2022   Q1: Little interest or pleasure in doing things 0   Q2: Feeling down, depressed or hopeless 0   PHQ-2 Score 0   PHQ-2 Total Score (12-17 Years)- Positive if 3 or more points; Administer PHQ-A if positive -       Abuse: Current or Past(Physical, Sexual or Emotional)- No  Do you feel safe in your environment? Yes    Have you ever done Advance Care Planning? (For example, a Health Directive, POLST, or a discussion with a medical provider or your loved ones about your wishes): Yes, patient states has an Advance Care Planning document and will bring a copy to the clinic.    Social History     Tobacco Use     Smoking status: Never Smoker     Smokeless tobacco: Never Used   Substance Use Topics     Alcohol use: Yes     Alcohol/week: 1.7 standard drinks     Types: 2 Standard drinks or equivalent per week             Last PSA:   Abbott PSA   Date Value Ref Range Status   12/21/2018 0.8 < OR = 4.0 ng/mL Final     Comment:     The total PSA value from this assay system is   standardized against the WHO standard. The test   result will be approximately 20% lower when compared   to the equimolar-standardized total PSA (Arturo   Whiting). Comparison of serial PSA results should be   interpreted with this fact in mind.      This test was performed using the Siemens   chemiluminescent method. Values obtained from   different assay methods cannot be used  interchangeably. PSA levels, regardless of  value, should not be interpreted as absolute  evidence of the presence or absence of disease.         Reviewed orders with patient. Reviewed health maintenance and updated orders accordingly - Yes    Lab work is in process  Labs reviewed in EPIC  BP Readings from Last 3 Encounters:   02/25/22 110/76   01/22/21 126/74   12/13/19 116/78    Wt Readings from Last 3 Encounters:   02/25/22 119.3 kg (263 lb)   01/22/21 112.4 kg (247 lb 11.2 oz)   12/13/19 114.3 kg (252 lb)                  Patient Active Problem List   Diagnosis     Mixed hyperlipidemia     ACP (advance care planning)     Health Care Home     Chronic rhinitis     Erectile dysfunction, unspecified erectile dysfunction type     Grief reaction     Elevated fasting glucose     Obesity (BMI 30.0-34.9)     History of colonic polyps - repeat colonoscopy 2022     Past Surgical History:   Procedure Laterality Date     AS TOTAL KNEE ARTHROPLASTY Right 2015     CARPAL TUNNEL RELEASE RT/LT Left 2020     HC FEMUR/KNEE SURG UNLISTED Right 1980    debridement of meniscal tear     HC VASECTOMY UNILAT/BILAT W POSTOP SEMEN      Vasectomy     HERNIORRHAPHY INGUINAL Right 11/03/2017    Procedure: HERNIORRHAPHY INGUINAL;  Right Inguinal Hernia Repair with Mesh ;  Surgeon: Reginaldo Bearden MD;  Location:  OR     [facility] TOTAL KNEE ARTHROPLASTY Left 2019       Social History     Tobacco Use     Smoking status: Never Smoker     Smokeless tobacco: Never Used   Substance Use Topics     Alcohol use: Yes     Alcohol/week: 1.7 standard drinks     Types: 2 Standard drinks or equivalent per week     Family History   Problem Relation Age of Onset     Cancer Mother 70        peritoneal     Heart Disease Father         MI x 10, cab x 2--ages 36 and 44     Diabetes Father      Coronary Artery Disease Father       Hyperlipidemia Father      Other - See Comments Sister         prediabetes     Coronary Artery Disease Paternal Grandfather      Cancer - colorectal No family hx of      Prostate Cancer No family hx of          Current Outpatient Medications   Medication Sig Dispense Refill     FLUoxetine (PROZAC) 10 MG capsule Take 1 capsule (10 mg) by mouth daily 90 capsule 3     sildenafil (VIAGRA) 50 MG tablet Take 1 tablet (50 mg) by mouth daily as needed (take 30 min prior to intercourse) 30 tablet 4     simvastatin (ZOCOR) 40 MG tablet Take 1 tablet (40 mg) by mouth daily 90 tablet 3     Cetirizine HCl (ZYRTEC ALLERGY PO)        Allergies   Allergen Reactions     No Known Allergies      Recent Labs   Lab Test 01/22/21  1420 01/22/21  1115 01/22/21  1114 12/13/19  1417 12/13/19  0000 12/21/18  1139 12/21/18  1111 10/06/17  1117 09/02/16  0835 02/27/16  2227   A1C 5.1  --   --  5.3  --   --  5.6  --   --   --    LDL  --   --  96  --  94 115*  --  110* 109  --    HDL  --   --  59  --  72 55  --  61 52  --    TRIG  --   --  105  --  59 121  --  75 114  --    ALT  --   --   --   --   --  8*  --  8* 11  --    CR  --  1.20  --   --  1.17 1.08  --  1.13 1.12  --    GFRESTIMATED  --   --   --   --   --  76  --  73 75 63   GFRESTBLACK  --   --   --   --   --   --   --   --   --  77   POTASSIUM  --  4.52  --   --  4.63 4.1  --  4.5 4.8 3.6          Past Medical History:   Diagnosis Date     Family history of cardiovascular disease 5/18/2012     Mixed hyperlipidemia 5/23/2003     Other internal derangement of knee(717.89) 1980    right medial meniscus tear s/p arthroscopy, debridement      Past Surgical History:   Procedure Laterality Date     AS TOTAL KNEE ARTHROPLASTY Right 2015     CARPAL TUNNEL RELEASE RT/LT Left 2020     HC FEMUR/KNEE SURG UNLISTED Right 1980    debridement of meniscal tear     HC VASECTOMY UNILAT/BILAT W POSTOP SEMEN      Vasectomy     HERNIORRHAPHY INGUINAL Right 11/03/2017    Procedure: HERNIORRHAPHY INGUINAL;   Right Inguinal Hernia Repair with Mesh ;  Surgeon: Reginaldo Bearden MD;  Location:  OR     [facility] TOTAL KNEE ARTHROPLASTY Left 2019       Review of Systems  CONSTITUTIONAL: NEGATIVE for fever, chills, change in weight  INTEGUMENTARY/SKIN: new spot on face  EYES: NEGATIVE for vision changes or irritation  ENT: NEGATIVE for ear, mouth and throat problems  RESP: NEGATIVE for significant cough or SOB  CV: NEGATIVE for chest pain, palpitations or peripheral edema  GI: NEGATIVE for nausea, abdominal pain, heartburn, or change in bowel habits   male: negative for dysuria, hematuria, decreased urinary stream, erectile dysfunction, urethral discharge  MUSCULOSKELETAL: NEGATIVE for significant arthralgias or myalgia  NEURO: NEGATIVE for weakness, dizziness or paresthesias  PSYCHIATRIC: NEGATIVE for changes in mood or affect    OBJECTIVE:   /76 (BP Location: Left arm, Patient Position: Chair, Cuff Size: Adult Large)   Pulse 60   Temp 97.6  F (36.4  C)   Resp 20   Wt 119.3 kg (263 lb)   BMI 33.32 kg/m      Physical Exam  GENERAL: healthy, alert and no distress  EYES: Eyes grossly normal to inspection, PERRL and conjunctivae and sclerae normal  HENT: ear canals and TM's normal, nose and mouth without ulcers or lesions  NECK: no adenopathy, no asymmetry, masses, or scars and thyroid normal to palpation  RESP: lungs clear to auscultation - no rales, rhonchi or wheezes  CV: regular rate and rhythm, normal S1 S2, no S3 or S4, no murmur, click or rub, no peripheral edema and peripheral pulses strong  ABDOMEN: soft, nontender, no hepatosplenomegaly, no masses and bowel sounds normal  MS: no gross musculoskeletal defects noted, no edema  SKIN:  2x2 macule on left temple  NEURO: Normal strength and tone, mentation intact and speech normal  PSYCH: mentation appears normal, affect normal/bright    Diagnostic Test Results:  Labs reviewed in Epic    ASSESSMENT/PLAN:   Kristopher was seen today for physical.    Diagnoses and all orders  "for this visit:    Routine general medical examination at a health care facility  -     PSA Total ([facility])  -     VENOUS COLLECTION  -     Comprehensive Metobolic Panel (BFP)  -     Lipid Panel (BFP)    Elevated fasting glucose  -     VENOUS COLLECTION  -     Comprehensive Metobolic Panel (BFP)    Erectile dysfunction, unspecified erectile dysfunction type  -     sildenafil (VIAGRA) 50 MG tablet; Take 1 tablet (50 mg) by mouth daily as needed (take 30 min prior to intercourse)  -     PSA Total ([facility])  -     VENOUS COLLECTION    Skin neoplasm  -     Adult Dermatology Referral; Future    Mixed hyperlipidemia  -     simvastatin (ZOCOR) 40 MG tablet; Take 1 tablet (40 mg) by mouth daily  -     VENOUS COLLECTION  -     Comprehensive Metobolic Panel (BFP)  -     Radiology Referral; Future  -     CT Coronary Calcium Scan; Future  Pt will get CT scan    Grief reaction  -     FLUoxetine (PROZAC) 10 MG capsule; Take 1 capsule (10 mg) by mouth daily    Family history of coronary artery disease in father  -     Radiology Referral; Future  -     CT Coronary Calcium Scan; Future        Patient has been advised of split billing requirements and indicates understanding: Yes    COUNSELING:   Reviewed preventive health counseling, as reflected in patient instructions    Estimated body mass index is 33.32 kg/m  as calculated from the following:    Height as of 12/13/19: 1.892 m (6' 2.5\").    Weight as of this encounter: 119.3 kg (263 lb).     Weight management plan: Discussed healthy diet and exercise guidelines    He reports that he has never smoked. He has never used smokeless tobacco.      Counseling Resources:  ATP IV Guidelines  Pooled Cohorts Equation Calculator  FRAX Risk Assessment  ICSI Preventive Guidelines  Dietary Guidelines for Americans, 2010  USDA's MyPlate  ASA Prophylaxis  Lung CA Screening    [facility]  [place] [facility]    "

## 2022-02-25 NOTE — NURSING NOTE
Kristopher Gomez is here for a CPX.    Pre-visit planning  Immunizations -up to date  Colonoscopy -is up to date  Mammogram -  Asthma test --  PHQ9 -  STEVAN 7 -  Hearing screen -    Questioned patient about current smoking habits.  Pt. has never smoked.  Body mass index is 33.32 kg/m .  PULSE regular  My Chart: active  CLASSIFICATION OF OVERWEIGHT AND OBESITY BY BMI                        Obesity Class           BMI(kg/m2)  Underweight                                    < 18.5  Normal                                         18.5-24.9  Overweight                                     25.0-29.9  OBESITY                     I                  30.0-34.9                             II                 35.0-39.9  EXTREME OBESITY             III                >40                            Patient's  BMI Body mass index is 33.32 kg/m .

## 2022-02-26 LAB — ABBOTT PSA - QUEST: 0.73 NG/ML

## 2022-02-28 PROBLEM — D49.2 SKIN NEOPLASM: Status: ACTIVE | Noted: 2022-02-28

## 2022-05-17 ENCOUNTER — DOCUMENTATION ONLY (OUTPATIENT)
Dept: OTHER | Facility: CLINIC | Age: 62
End: 2022-05-17

## 2022-06-17 NOTE — TELEPHONE ENCOUNTER
Referral received from Opal Tariq for Right Inguinal hernia.    Attempt #1:    Called patient at [phone].  No answer - left message for patient to return call to clinic at [phone].  Imelda   Awake

## 2022-07-06 ENCOUNTER — TELEPHONE (OUTPATIENT)
Dept: FAMILY MEDICINE | Facility: CLINIC | Age: 62
End: 2022-07-06

## 2022-07-06 NOTE — TELEPHONE ENCOUNTER
Pt called stating he tested POS for COVID and was interested in Paxlovid. Advised him we would need a virtual visit. He is in [place] will go to urgent care near him instead.

## 2022-10-09 ENCOUNTER — HEALTH MAINTENANCE LETTER (OUTPATIENT)
Age: 62
End: 2022-10-09

## 2023-01-16 NOTE — TELEPHONE ENCOUNTER
Kristopher Gomez is requesting a refill of:    Refused Prescriptions:                       Disp   Refills    simvastatin (ZOCOR) 40 MG tablet [Pharmacy*90 tab*3        Sig: TAKE 1 TABLET BY MOUTH EVERY DAY  Refused By: LESTER CRUZ  Reason for Refusal: Patient needs appointment    Last seen 12/12/18. Pt due for FASTING OV.    used

## 2023-03-13 ENCOUNTER — OFFICE VISIT (OUTPATIENT)
Dept: FAMILY MEDICINE | Facility: CLINIC | Age: 63
End: 2023-03-13

## 2023-03-13 VITALS
TEMPERATURE: 97.3 F | DIASTOLIC BLOOD PRESSURE: 68 MMHG | WEIGHT: 253 LBS | HEIGHT: 75 IN | HEART RATE: 70 BPM | BODY MASS INDEX: 31.46 KG/M2 | OXYGEN SATURATION: 97 % | SYSTOLIC BLOOD PRESSURE: 118 MMHG

## 2023-03-13 DIAGNOSIS — N52.9 ERECTILE DYSFUNCTION, UNSPECIFIED ERECTILE DYSFUNCTION TYPE: ICD-10-CM

## 2023-03-13 DIAGNOSIS — Z23 NEED FOR VACCINATION: ICD-10-CM

## 2023-03-13 DIAGNOSIS — Z12.5 SCREENING FOR PROSTATE CANCER: ICD-10-CM

## 2023-03-13 DIAGNOSIS — Z13.228 SCREENING FOR ENDOCRINE, METABOLIC AND IMMUNITY DISORDER: ICD-10-CM

## 2023-03-13 DIAGNOSIS — E78.2 MIXED HYPERLIPIDEMIA: ICD-10-CM

## 2023-03-13 DIAGNOSIS — Z13.0 SCREENING FOR ENDOCRINE, METABOLIC AND IMMUNITY DISORDER: ICD-10-CM

## 2023-03-13 DIAGNOSIS — Z13.29 SCREENING FOR ENDOCRINE, METABOLIC AND IMMUNITY DISORDER: ICD-10-CM

## 2023-03-13 DIAGNOSIS — Z12.11 SPECIAL SCREENING FOR MALIGNANT NEOPLASMS, COLON: ICD-10-CM

## 2023-03-13 DIAGNOSIS — Z00.00 ROUTINE GENERAL MEDICAL EXAMINATION AT A HEALTH CARE FACILITY: Primary | ICD-10-CM

## 2023-03-13 LAB
ALBUMIN SERPL-MCNC: 4.1 G/DL (ref 3.6–5.1)
ALBUMIN/GLOB SERPL: 1.5 {RATIO} (ref 1–2.5)
ALP SERPL-CCNC: 73 U/L (ref 33–130)
ALT 1742-6: 6 U/L (ref 0–32)
AST 1920-8: 11 U/L (ref 0–35)
BILIRUB SERPL-MCNC: 0.9 MG/DL (ref 0.2–1.2)
BUN SERPL-MCNC: 19 MG/DL (ref 7–25)
BUN/CREATININE RATIO: 17.3 (ref 6–22)
CALCIUM SERPL-MCNC: 9.4 MG/DL (ref 8.6–10.3)
CHLORIDE SERPLBLD-SCNC: 104.4 MMOL/L (ref 98–110)
CHOLEST SERPL-MCNC: 185 MG/DL (ref 0–199)
CHOLEST/HDLC SERPL: 3 {RATIO} (ref 0–5)
CO2 SERPL-SCNC: 28.1 MMOL/L (ref 20–32)
CREAT SERPL-MCNC: 1.1 MG/DL (ref 0.6–1.3)
GLOBULIN, CALCULATED - QUEST: 2.7 (ref 1.9–3.7)
GLUCOSE SERPL-MCNC: 110 MG/DL (ref 60–99)
HDLC SERPL-MCNC: 63 MG/DL (ref 40–150)
LDLC SERPL CALC-MCNC: 106 MG/DL (ref 0–130)
POTASSIUM SERPL-SCNC: 4.68 MMOL/L (ref 3.5–5.3)
PROT SERPL-MCNC: 6.8 G/DL (ref 6.1–8.1)
SODIUM SERPL-SCNC: 139.3 MMOL/L (ref 135–146)
TRIGL SERPL-MCNC: 82 MG/DL (ref 0–149)

## 2023-03-13 PROCEDURE — 80061 LIPID PANEL: CPT | Performed by: PHYSICIAN ASSISTANT

## 2023-03-13 PROCEDURE — 99213 OFFICE O/P EST LOW 20 MIN: CPT | Mod: 25 | Performed by: PHYSICIAN ASSISTANT

## 2023-03-13 PROCEDURE — 90715 TDAP VACCINE 7 YRS/> IM: CPT | Performed by: PHYSICIAN ASSISTANT

## 2023-03-13 PROCEDURE — 36415 COLL VENOUS BLD VENIPUNCTURE: CPT | Performed by: PHYSICIAN ASSISTANT

## 2023-03-13 PROCEDURE — 80053 COMPREHEN METABOLIC PANEL: CPT | Performed by: PHYSICIAN ASSISTANT

## 2023-03-13 PROCEDURE — 90471 IMMUNIZATION ADMIN: CPT | Performed by: PHYSICIAN ASSISTANT

## 2023-03-13 PROCEDURE — 99396 PREV VISIT EST AGE 40-64: CPT | Mod: 25 | Performed by: PHYSICIAN ASSISTANT

## 2023-03-13 PROCEDURE — 84153 ASSAY OF PSA TOTAL: CPT | Mod: 90 | Performed by: PHYSICIAN ASSISTANT

## 2023-03-13 RX ORDER — SIMVASTATIN 40 MG
40 TABLET ORAL DAILY
Qty: 90 TABLET | Refills: 3 | Status: SHIPPED | OUTPATIENT
Start: 2023-03-13 | End: 2024-02-26

## 2023-03-13 NOTE — PROGRESS NOTES
SUBJECTIVE:   CC: Kristopher is an 62 year old who presents for preventative health visit.     Patient has been advised of split billing requirements and indicates understanding: Yes  HPI      Hyperlipidemia Follow-Up      Are you regularly taking any medication or supplement to lower your cholesterol?   Yes- simvastatin    Are you having muscle aches or other side effects that you think could be caused by your cholesterol lowering medication?  No   No concerns taking nightly.     ED: using Viagra weekly. Working well. Taking 50mg and works well for him. No refills needed, will call when he needs this.     Today's PHQ-2 Score:   PHQ-2 ( 1999 Pfizer) 3/13/2023   Q1: Little interest or pleasure in doing things 0   Q2: Feeling down, depressed or hopeless 0   PHQ-2 Score 0   PHQ-2 Total Score (12-17 Years)- Positive if 3 or more points; Administer PHQ-A if positive -       Diet-good. Good fruit and vegetable intake. Generally eats at home.  Exercise-5 days a week. No concerns.  Sleep-good. 6-7hrs/night.   BM-daily, no concerns  Vitamin Use-none  Dentist-1x a year  Eye Doctor-due, several years ago  Colon-ordered today-due every 5 years  PSA-ordered today    Due for Tdap today.      Pt on Prozac-no concerns today, wants to stop this. Advised to take every other day for 1 week, RTC if symptoms worsen.     Social History     Tobacco Use     Smoking status: Never     Smokeless tobacco: Never   Substance Use Topics     Alcohol use: Yes     Alcohol/week: 1.7 standard drinks     Types: 2 Standard drinks or equivalent per week         No flowsheet data found.    Last PSA:   Abbott PSA   Date Value Ref Range Status   02/25/2022 0.73 < OR = 4.00 ng/mL Final     Comment:     The total PSA value from this assay system is   standardized against the WHO standard. The test   result will be approximately 20% lower when compared   to the equimolar-standardized total PSA (Arturo   Mary). Comparison of serial PSA results should be    interpreted with this fact in mind.     This test was performed using the Siemens   chemiluminescent method. Values obtained from   different assay methods cannot be used  interchangeably. PSA levels, regardless of  value, should not be interpreted as absolute  evidence of the presence or absence of disease.         Reviewed orders with patient. Reviewed health maintenance and updated orders accordingly - Yes  Lab work is in process    Reviewed and updated as needed this visit by clinical staff                  Reviewed and updated as needed this visit by Provider                 Past Medical History:   Diagnosis Date     Family history of cardiovascular disease 5/18/2012     Mixed hyperlipidemia 5/23/2003     Other internal derangement of knee(717.89) 1980    right medial meniscus tear s/p arthroscopy, debridement      Past Surgical History:   Procedure Laterality Date     AS TOTAL KNEE ARTHROPLASTY Right 2015     CARPAL TUNNEL RELEASE RT/LT Left 2020     HC FEMUR/KNEE SURG UNLISTED Right 1980    debridement of meniscal tear     HC VASECTOMY UNILAT/BILAT W POSTOP SEMEN      Vasectomy     HERNIORRHAPHY INGUINAL Right 11/03/2017    Procedure: HERNIORRHAPHY INGUINAL;  Right Inguinal Hernia Repair with Mesh ;  Surgeon: Reginaldo Bearden MD;  Location:  OR     [facility] TOTAL KNEE ARTHROPLASTY Left 2019       Review of Systems  CONSTITUTIONAL: NEGATIVE for fever, chills, change in weight  INTEGUMENTARY/SKIN: NEGATIVE for worrisome rashes, moles or lesions  EYES: NEGATIVE for vision changes or irritation  ENT: NEGATIVE for ear, mouth and throat problems  RESP: NEGATIVE for significant cough or SOB  CV: NEGATIVE for chest pain, palpitations or peripheral edema  GI: NEGATIVE for nausea, abdominal pain, heartburn, or change in bowel habits   male: negative for dysuria, hematuria, decreased urinary stream, erectile dysfunction, urethral discharge  MUSCULOSKELETAL: NEGATIVE for significant arthralgias or myalgia  NEURO:  "NEGATIVE for weakness, dizziness or paresthesias  PSYCHIATRIC: NEGATIVE for changes in mood or affect    OBJECTIVE:   /68 (BP Location: Right arm, Patient Position: Sitting, Cuff Size: Adult Large)   Pulse 70   Temp 97.3  F (36.3  C) (Oral)   Ht 1.905 m (6' 3\")   Wt 114.8 kg (253 lb)   SpO2 97%   BMI 31.62 kg/m      Physical Exam  GENERAL: healthy, alert and no distress  EYES: Eyes grossly normal to inspection, PERRL and conjunctivae and sclerae normal  HENT: ear canals and TM's normal, nose and mouth without ulcers or lesions  NECK: no adenopathy, no asymmetry, masses, or scars and thyroid normal to palpation  RESP: lungs clear to auscultation - no rales, rhonchi or wheezes  CV: regular rate and rhythm, normal S1 S2, no S3 or S4, no murmur, click or rub, no peripheral edema and peripheral pulses strong  ABDOMEN: soft, nontender, no hepatosplenomegaly, no masses and bowel sounds normal  MS: no gross musculoskeletal defects noted, no edema  SKIN: no suspicious lesions or rashes  NEURO: Normal strength and tone, mentation intact and speech normal    Diagnostic Test Results:  Labs reviewed in Epic    ASSESSMENT/PLAN:       ICD-10-CM    1. Routine general medical examination at a health care facility  Z00.00 TDAP VACCINE (Adacel, Boostrix)  [[id number]]     Colonoscopy Screening  Referral     VENOUS COLLECTION     Comprehensive Metobolic Panel (BFP)     PSA Total ([facility])      2. Special screening for malignant neoplasms, colon  Z12.11 Colonoscopy Screening  Referral      3. Need for vaccination  Z23 TDAP VACCINE (Adacel, Boostrix)  [[id number]]      4. Screening for prostate cancer  Z12.5 VENOUS COLLECTION     PSA Total ([facility])      5. Screening for endocrine, metabolic and immunity disorder  Z13.29 VENOUS COLLECTION    Z13.228 Comprehensive Metobolic Panel (BFP)    Z13.0       6. Mixed hyperlipidemia  E78.2 VENOUS COLLECTION     Lipid Panel (BFP)     simvastatin (ZOCOR) 40 MG tablet      7. " Erectile dysfunction, unspecified erectile dysfunction type  N52.9         ED: no refills needed, stable, pt will call when he needs refill over next year    Hyperlipidemia: stable, no concerns. Will refill for 1 year pending labs    Patient has been advised of split billing requirements and indicates understanding: Yes      COUNSELING:   Reviewed preventive health counseling, as reflected in patient instructions       Regular exercise       Healthy diet/nutrition       Vision screening       Colorectal cancer screening       Prostate cancer screening        He reports that he has never smoked. He has never used smokeless tobacco.            Sly Ellington PA-C  [facility]

## 2023-03-14 LAB — ABBOTT PSA - QUEST: 0.91 NG/ML

## 2024-02-07 ENCOUNTER — PATIENT OUTREACH (OUTPATIENT)
Dept: GASTROENTEROLOGY | Facility: CLINIC | Age: 64
End: 2024-02-07

## 2024-02-26 ENCOUNTER — MYC MEDICAL ADVICE (OUTPATIENT)
Dept: FAMILY MEDICINE | Facility: CLINIC | Age: 64
End: 2024-02-26

## 2024-02-26 DIAGNOSIS — E78.2 MIXED HYPERLIPIDEMIA: ICD-10-CM

## 2024-02-26 RX ORDER — SIMVASTATIN 40 MG
40 TABLET ORAL DAILY
Qty: 30 TABLET | Refills: 0 | Status: SHIPPED | OUTPATIENT
Start: 2024-02-26 | End: 2024-02-26

## 2024-02-26 RX ORDER — SIMVASTATIN 40 MG
40 TABLET ORAL DAILY
Qty: 30 TABLET | Refills: 0 | Status: SHIPPED | OUTPATIENT
Start: 2024-02-26 | End: 2024-04-25

## 2024-04-24 NOTE — PROGRESS NOTES
Preventive Care Visit  [facility], PCARLOS Ellington PA-C, Family Medicine  Apr 25, 2024      SUBJECTIVE:   Kristopher is a 63 year old, presenting for the following:  No chief complaint on file.      HPI    Diet-good, no concerns  Exercise-5x a week  Sleep-6-7hrs/night  BM-daily, no issues  Vitamin Use-none  Dentist-2x a year  Eye Doctor-not UTD  Colon-due 2030  PSA-ok with ordered      Prozac: stopped this last year, interested in restarting. Sees a therapist off and on. Has used Prozac 10mg in the past-restarted this drug 1 month ago, feels better since. Less irritable, more energy in the mornings on this drug.     Hyperlipidemia Follow-Up    Are you regularly taking any medication or supplement to lower your cholesterol?   Yes- simvastatin  Are you having muscle aches or other side effects that you think could be caused by your cholesterol lowering medication?  No  Strong FH HLD, no concerns.     Social History     Tobacco Use    Smoking status: Never    Smokeless tobacco: Never   Substance Use Topics    Alcohol use: Yes     Alcohol/week: 1.7 standard drinks of alcohol     Types: 2 Standard drinks or equivalent per week              No data to display                Last PSA:   Abbott PSA   Date Value Ref Range Status   03/13/2023 0.91 < OR = 4.00 ng/mL Final     Comment:     The total PSA value from this assay system is   standardized against the WHO standard. The test   result will be approximately 20% lower when compared   to the equimolar-standardized total PSA (Arturo   Mary). Comparison of serial PSA results should be   interpreted with this fact in mind.     This test was performed using the Siemens   chemiluminescent method. Values obtained from   different assay methods cannot be used  interchangeably. PSA levels, regardless of  value, should not be interpreted as absolute  evidence of the presence or absence of disease.         Reviewed orders with patient. Reviewed health maintenance  "and updated orders accordingly - Yes  Lab work is in process    Reviewed and updated as needed this visit by clinical staff                  Reviewed and updated as needed this visit by Provider                  Past Medical History:   Diagnosis Date    Family history of cardiovascular disease 5/18/2012    Mixed hyperlipidemia 5/23/2003    Other internal derangement of knee(717.89) 1980    right medial meniscus tear s/p arthroscopy, debridement      Past Surgical History:   Procedure Laterality Date    AS TOTAL KNEE ARTHROPLASTY Right 2015    CARPAL TUNNEL RELEASE RT/LT Left 2020    HC FEMUR/KNEE SURG UNLISTED Right 1980    debridement of meniscal tear    HC VASECTOMY UNILAT/BILAT W POSTOP SEMEN      Vasectomy    HERNIORRHAPHY INGUINAL Right 11/03/2017    Procedure: HERNIORRHAPHY INGUINAL;  Right Inguinal Hernia Repair with Mesh ;  Surgeon: Reginaldo Bearden MD;  Location:  OR    [facility] TOTAL KNEE ARTHROPLASTY Left 2019     Review of Systems    Review of Systems  Constitutional, neuro, ENT, endocrine, pulmonary, cardiac, gastrointestinal, genitourinary, musculoskeletal, integument and psychiatric systems are negative, except as otherwise noted.    OBJECTIVE:   There were no vitals taken for this visit.   Estimated body mass index is 31.62 kg/m  as calculated from the following:    Height as of 3/13/23: 1.905 m (6' 3\").    Weight as of 3/13/23: 114.8 kg (253 lb).  Physical Exam  GENERAL: alert and no distress  EYES: Eyes grossly normal to inspection, PERRL and conjunctivae and sclerae normal  HENT: ear canals and TM's normal, nose and mouth without ulcers or lesions  NECK: no adenopathy, no asymmetry, masses, or scars  RESP: lungs clear to auscultation - no rales, rhonchi or wheezes  CV: regular rate and rhythm, normal S1 S2, no S3 or S4, no murmur, click or rub, no peripheral edema  ABDOMEN: soft, nontender, no hepatosplenomegaly, no masses and bowel sounds normal  MS: no gross musculoskeletal defects noted, no " edema  NEURO: Normal strength and tone, mentation intact and speech normal  PSYCH: mentation appears normal, affect normal/bright    Diagnostic Test Results:  Labs reviewed in Epic    ASSESSMENT/PLAN:   Mixed hyperlipidemia-stable, refilled without change  Low fat diet  - simvastatin (ZOCOR) 40 MG tablet  Dispense: 90 tablet; Refill: 3  - VENOUS COLLECTION  - Lipid Panel (BFP)    Grief reaction-pt open to restarting meds which I feel is a good choice at this time. Already feeling better with restarting his old Rx, no med changes needed  Call if any worsening  - FLUoxetine (PROZAC) 10 MG capsule  Dispense: 90 capsule; Refill: 3    Screening for endocrine, metabolic and immunity disorder    - VENOUS COLLECTION  - Basic Metabolic Panel (BFP)  - HEMOGLOBIN A1C (BFP)    Routine general medical examination at a health care facility    - VENOUS COLLECTION  - Lipid Panel (BFP)  - Basic Metabolic Panel (BFP)  - PSA Total ([facility])  - HEMOGLOBIN A1C (BFP)    Screening for malignant neoplasm of prostate    - VENOUS COLLECTION  - PSA Total ([facility])      Patient has been advised of split billing requirements and indicates understanding: Yes      Counseling  Reviewed preventive health counseling, as reflected in patient instructions       Regular exercise       Healthy diet/nutrition       Vision screening       Colorectal cancer screening       Prostate cancer screening        He reports that he has never smoked. He has never used smokeless tobacco.            Signed Electronically by: Sly Ellington PA-C

## 2024-04-25 ENCOUNTER — OFFICE VISIT (OUTPATIENT)
Dept: FAMILY MEDICINE | Facility: CLINIC | Age: 64
End: 2024-04-25

## 2024-04-25 VITALS
DIASTOLIC BLOOD PRESSURE: 76 MMHG | SYSTOLIC BLOOD PRESSURE: 118 MMHG | HEIGHT: 75 IN | TEMPERATURE: 97.7 F | BODY MASS INDEX: 31.83 KG/M2 | WEIGHT: 256 LBS | OXYGEN SATURATION: 97 % | HEART RATE: 64 BPM

## 2024-04-25 DIAGNOSIS — E78.2 MIXED HYPERLIPIDEMIA: ICD-10-CM

## 2024-04-25 DIAGNOSIS — Z12.5 SCREENING FOR MALIGNANT NEOPLASM OF PROSTATE: ICD-10-CM

## 2024-04-25 DIAGNOSIS — F43.21 GRIEF REACTION: ICD-10-CM

## 2024-04-25 DIAGNOSIS — Z00.00 ROUTINE GENERAL MEDICAL EXAMINATION AT A HEALTH CARE FACILITY: Primary | ICD-10-CM

## 2024-04-25 DIAGNOSIS — Z13.29 SCREENING FOR ENDOCRINE, METABOLIC AND IMMUNITY DISORDER: ICD-10-CM

## 2024-04-25 DIAGNOSIS — Z13.228 SCREENING FOR ENDOCRINE, METABOLIC AND IMMUNITY DISORDER: ICD-10-CM

## 2024-04-25 DIAGNOSIS — Z13.0 SCREENING FOR ENDOCRINE, METABOLIC AND IMMUNITY DISORDER: ICD-10-CM

## 2024-04-25 LAB
BUN SERPL-MCNC: 13 MG/DL (ref 7–25)
BUN/CREATININE RATIO: 11.4 (ref 6–32)
CALCIUM SERPL-MCNC: 9 MG/DL (ref 8.6–10.3)
CHLORIDE SERPLBLD-SCNC: 104.7 MMOL/L (ref 98–110)
CHOLEST SERPL-MCNC: 159 MG/DL (ref 0–199)
CHOLEST/HDLC SERPL: 3 {RATIO} (ref 0–5)
CO2 SERPL-SCNC: 28.9 MMOL/L (ref 20–32)
CREAT SERPL-MCNC: 1.14 MG/DL (ref 0.6–1.3)
GLUCOSE SERPL-MCNC: 97 MG/DL (ref 60–99)
HDLC SERPL-MCNC: 53 MG/DL (ref 40–150)
HEMOGLOBIN A1C: 5.7 % (ref 4–5.6)
LDLC SERPL CALC-MCNC: 92 MG/DL (ref 0–130)
POTASSIUM SERPL-SCNC: 4.64 MMOL/L (ref 3.5–5.3)
SODIUM SERPL-SCNC: 139.8 MMOL/L (ref 135–146)
TRIGL SERPL-MCNC: 71 MG/DL (ref 0–149)

## 2024-04-25 PROCEDURE — 36415 COLL VENOUS BLD VENIPUNCTURE: CPT | Performed by: PHYSICIAN ASSISTANT

## 2024-04-25 PROCEDURE — 83036 HEMOGLOBIN GLYCOSYLATED A1C: CPT | Performed by: PHYSICIAN ASSISTANT

## 2024-04-25 PROCEDURE — 99213 OFFICE O/P EST LOW 20 MIN: CPT | Mod: 25 | Performed by: PHYSICIAN ASSISTANT

## 2024-04-25 PROCEDURE — 99396 PREV VISIT EST AGE 40-64: CPT | Performed by: PHYSICIAN ASSISTANT

## 2024-04-25 PROCEDURE — 80061 LIPID PANEL: CPT | Performed by: PHYSICIAN ASSISTANT

## 2024-04-25 PROCEDURE — 80048 BASIC METABOLIC PNL TOTAL CA: CPT | Performed by: PHYSICIAN ASSISTANT

## 2024-04-25 PROCEDURE — 84153 ASSAY OF PSA TOTAL: CPT | Mod: 90 | Performed by: PHYSICIAN ASSISTANT

## 2024-04-25 RX ORDER — FLUOXETINE 10 MG/1
10 CAPSULE ORAL DAILY
Qty: 90 CAPSULE | Refills: 3 | Status: SHIPPED | OUTPATIENT
Start: 2024-04-25

## 2024-04-25 RX ORDER — SIMVASTATIN 40 MG
40 TABLET ORAL DAILY
Qty: 90 TABLET | Refills: 3 | Status: SHIPPED | OUTPATIENT
Start: 2024-04-25

## 2024-04-25 ASSESSMENT — ANXIETY QUESTIONNAIRES
6. BECOMING EASILY ANNOYED OR IRRITABLE: SEVERAL DAYS
1. FEELING NERVOUS, ANXIOUS, OR ON EDGE: SEVERAL DAYS
GAD7 TOTAL SCORE: 2
5. BEING SO RESTLESS THAT IT IS HARD TO SIT STILL: NOT AT ALL
IF YOU CHECKED OFF ANY PROBLEMS ON THIS QUESTIONNAIRE, HOW DIFFICULT HAVE THESE PROBLEMS MADE IT FOR YOU TO DO YOUR WORK, TAKE CARE OF THINGS AT HOME, OR GET ALONG WITH OTHER PEOPLE: SOMEWHAT DIFFICULT
3. WORRYING TOO MUCH ABOUT DIFFERENT THINGS: NOT AT ALL
GAD7 TOTAL SCORE: 2
7. FEELING AFRAID AS IF SOMETHING AWFUL MIGHT HAPPEN: NOT AT ALL
2. NOT BEING ABLE TO STOP OR CONTROL WORRYING: NOT AT ALL

## 2024-04-25 ASSESSMENT — PATIENT HEALTH QUESTIONNAIRE - PHQ9
5. POOR APPETITE OR OVEREATING: NOT AT ALL
SUM OF ALL RESPONSES TO PHQ QUESTIONS 1-9: 4

## 2024-04-25 NOTE — NURSING NOTE
Chief Complaint   Patient presents with    Physical     Pt here for his CPX. Is fasting    Pre-visit Screening:  Immunizations:  up to date  Colonoscopy:  is up to date  Mammogram: na  Asthma Action Test/Plan:  na  PHQ9:  updated  GAD7:  updated  Questioned patient about current smoking habits Pt. has never smoked.  Ok to leave detailed message on voice mail for today's visit only yes, phone # [phone]

## 2024-04-26 LAB — ABBOTT PSA - QUEST: 1.2 NG/ML

## 2025-03-18 DIAGNOSIS — E78.2 MIXED HYPERLIPIDEMIA: ICD-10-CM

## 2025-03-18 NOTE — TELEPHONE ENCOUNTER
Pt called asking for 30 day supply of simvastatin to be sent to [facility], he scheduled appt for 05/07. He also asked if you would send in an antibiotic prior to his upcoming dental appt. I can send him MYChart once extension is sent informing him this is no longer recommended. Please advise.    Kristopher Gomez is requesting a refill of:    Pending Prescriptions:                       Disp   Refills    simvastatin (ZOCOR) 40 MG tablet          30 tab*3            Sig: Take 1 tablet (40 mg) by mouth daily.

## 2025-03-19 RX ORDER — SIMVASTATIN 40 MG
40 TABLET ORAL DAILY
Qty: 120 TABLET | Refills: 0 | Status: SHIPPED | OUTPATIENT
Start: 2025-03-19

## 2025-03-19 NOTE — TELEPHONE ENCOUNTER
Approved, yes please let him know antibiotics no longer needed after joint replacement surgery, only if he has any significant cardiac history such as endocarditis-he should send me separate message to discuss if this is the case  Sly Ellington PA-C  [facility]

## 2025-05-07 ENCOUNTER — OFFICE VISIT (OUTPATIENT)
Dept: FAMILY MEDICINE | Facility: CLINIC | Age: 65
End: 2025-05-07

## 2025-05-07 VITALS
TEMPERATURE: 97.5 F | HEART RATE: 66 BPM | BODY MASS INDEX: 32.91 KG/M2 | OXYGEN SATURATION: 96 % | WEIGHT: 256.4 LBS | DIASTOLIC BLOOD PRESSURE: 74 MMHG | HEIGHT: 74 IN | SYSTOLIC BLOOD PRESSURE: 126 MMHG

## 2025-05-07 DIAGNOSIS — N52.9 ERECTILE DYSFUNCTION, UNSPECIFIED ERECTILE DYSFUNCTION TYPE: ICD-10-CM

## 2025-05-07 DIAGNOSIS — E78.2 MIXED HYPERLIPIDEMIA: ICD-10-CM

## 2025-05-07 DIAGNOSIS — Z00.00 ROUTINE GENERAL MEDICAL EXAMINATION AT A HEALTH CARE FACILITY: Primary | ICD-10-CM

## 2025-05-07 DIAGNOSIS — Z13.29 SCREENING FOR ENDOCRINE, METABOLIC AND IMMUNITY DISORDER: ICD-10-CM

## 2025-05-07 DIAGNOSIS — Z13.228 SCREENING FOR ENDOCRINE, METABOLIC AND IMMUNITY DISORDER: ICD-10-CM

## 2025-05-07 DIAGNOSIS — Z23 NEED FOR VACCINATION: ICD-10-CM

## 2025-05-07 DIAGNOSIS — Z13.0 SCREENING FOR ENDOCRINE, METABOLIC AND IMMUNITY DISORDER: ICD-10-CM

## 2025-05-07 DIAGNOSIS — F43.20 GRIEF REACTION: ICD-10-CM

## 2025-05-07 DIAGNOSIS — Z12.5 SCREENING FOR MALIGNANT NEOPLASM OF PROSTATE: ICD-10-CM

## 2025-05-07 DIAGNOSIS — H93.8X3 EAR CANAL MASS, BILATERAL: ICD-10-CM

## 2025-05-07 LAB
BUN SERPL-MCNC: 16 MG/DL (ref 7–25)
BUN/CREATININE RATIO: 14 (ref 6–32)
CALCIUM SERPL-MCNC: 9.4 MG/DL (ref 8.6–10.3)
CHLORIDE SERPLBLD-SCNC: 107.4 MMOL/L (ref 98–110)
CHOLEST SERPL-MCNC: 189 MG/DL (ref 0–199)
CHOLEST/HDLC SERPL: 3 {RATIO} (ref 0–5)
CO2 SERPL-SCNC: 24.6 MMOL/L (ref 20–32)
CREAT SERPL-MCNC: 1.17 MG/DL (ref 0.6–1.3)
GLUCOSE SERPL-MCNC: 109 MG/DL (ref 60–99)
HDLC SERPL-MCNC: 60 MG/DL (ref 40–150)
HEMOGLOBIN A1C: 5.6 % (ref 4–5.6)
LDLC SERPL CALC-MCNC: 111 MG/DL (ref 0–129)
POTASSIUM SERPL-SCNC: 4.21 MMOL/L (ref 3.5–5.3)
SODIUM SERPL-SCNC: 134.2 MMOL/L (ref 135–146)
TRIGL SERPL-MCNC: 89 MG/DL (ref 0–149)

## 2025-05-07 RX ORDER — SILDENAFIL 50 MG/1
50 TABLET, FILM COATED ORAL DAILY PRN
Qty: 30 TABLET | Refills: 4 | Status: CANCELLED | OUTPATIENT
Start: 2025-05-07

## 2025-05-07 RX ORDER — SIMVASTATIN 40 MG
40 TABLET ORAL DAILY
Qty: 90 TABLET | Refills: 3 | Status: SHIPPED | OUTPATIENT
Start: 2025-05-07

## 2025-05-07 RX ORDER — FLUOXETINE 10 MG/1
10 CAPSULE ORAL DAILY
Qty: 90 CAPSULE | Refills: 3 | Status: SHIPPED | OUTPATIENT
Start: 2025-05-07

## 2025-05-07 ASSESSMENT — ANXIETY QUESTIONNAIRES
6. BECOMING EASILY ANNOYED OR IRRITABLE: SEVERAL DAYS
IF YOU CHECKED OFF ANY PROBLEMS ON THIS QUESTIONNAIRE, HOW DIFFICULT HAVE THESE PROBLEMS MADE IT FOR YOU TO DO YOUR WORK, TAKE CARE OF THINGS AT HOME, OR GET ALONG WITH OTHER PEOPLE: SOMEWHAT DIFFICULT
2. NOT BEING ABLE TO STOP OR CONTROL WORRYING: NOT AT ALL
5. BEING SO RESTLESS THAT IT IS HARD TO SIT STILL: NOT AT ALL
GAD7 TOTAL SCORE: 1
1. FEELING NERVOUS, ANXIOUS, OR ON EDGE: NOT AT ALL
GAD7 TOTAL SCORE: 1
7. FEELING AFRAID AS IF SOMETHING AWFUL MIGHT HAPPEN: NOT AT ALL
3. WORRYING TOO MUCH ABOUT DIFFERENT THINGS: NOT AT ALL

## 2025-05-07 ASSESSMENT — PATIENT HEALTH QUESTIONNAIRE - PHQ9
5. POOR APPETITE OR OVEREATING: NOT AT ALL
SUM OF ALL RESPONSES TO PHQ QUESTIONS 1-9: 0

## 2025-05-07 NOTE — PROGRESS NOTES
Preventive Care Visit  [facility], PCARLOS Ellington PA-C, Family Medicine  May 7, 2025      SUBJECTIVE:   Kristopher is a 64 year old, presenting for the following:  Physical (Pt is here for a fasting CPX)      HPI    Diet-good diet, no concerns  Exercise-active, no concerns  Sleep-6-7hr/night  BM-daily no concerns  Vitamin Use-none  Dentist-2x a year  Eye Doctor-UTD  Colon-due in 3 years  PSA-ordered    Cholesterol: simvastatin daily, no concerns.    Prozac: taking daily for MDD. Helps with irritability, no concerns.       Social History     Tobacco Use    Smoking status: Never    Smokeless tobacco: Never   Substance Use Topics    Alcohol use: Yes     Alcohol/week: 1.7 standard drinks of alcohol     Types: 2 Standard drinks or equivalent per week              No data to display                Last PSA:   Abbott PSA   Date Value Ref Range Status   04/25/2024 1.20 < OR = 4.00 ng/mL Final     Comment:     The total PSA value from this assay system is   standardized against the WHO standard. The test   result will be approximately 20% lower when compared   to the equimolar-standardized total PSA (Arturo   Mary). Comparison of serial PSA results should be   interpreted with this fact in mind.     This test was performed using the Siemens   chemiluminescent method. Values obtained from   different assay methods cannot be used  interchangeably. PSA levels, regardless of  value, should not be interpreted as absolute  evidence of the presence or absence of disease.         Reviewed orders with patient. Reviewed health maintenance and updated orders accordingly - Yes  Lab work is in process    Reviewed and updated as needed this visit by clinical staff   Tobacco  Allergies    Med Hx  Surg Hx  Fam Hx          Reviewed and updated as needed this visit by Provider   Tobacco     Med Hx  Surg Hx  Fam Hx          Past Medical History:   Diagnosis Date    Family history of cardiovascular disease 5/18/2012  "   Mixed hyperlipidemia 5/23/2003    Other internal derangement of knee(717.89) 1980    right medial meniscus tear s/p arthroscopy, debridement      Past Surgical History:   Procedure Laterality Date    AS TOTAL KNEE ARTHROPLASTY Right 2015    CARPAL TUNNEL RELEASE RT/LT Left 2020    HC FEMUR/KNEE SURG UNLISTED Right 1980    debridement of meniscal tear    HC VASECTOMY UNILAT/BILAT W POSTOP SEMEN      Vasectomy    HERNIORRHAPHY INGUINAL Right 11/03/2017    Procedure: HERNIORRHAPHY INGUINAL;  Right Inguinal Hernia Repair with Mesh ;  Surgeon: Reginadlo Bearden MD;  Location:  OR    [facility] TOTAL KNEE ARTHROPLASTY Left 2019     Review of Systems    Review of Systems  Constitutional, neuro, ENT, endocrine, pulmonary, cardiac, gastrointestinal, genitourinary, musculoskeletal, integument and psychiatric systems are negative, except as otherwise noted.    OBJECTIVE:   /74 (BP Location: Right arm, Patient Position: Sitting, Cuff Size: Adult Large)   Pulse 66   Temp 97.5  F (36.4  C) (Oral)   Ht 1.88 m (6' 2\")   Wt 116.3 kg (256 lb 6.4 oz)   SpO2 96%   BMI 32.92 kg/m     Estimated body mass index is 32.92 kg/m  as calculated from the following:    Height as of this encounter: 1.88 m (6' 2\").    Weight as of this encounter: 116.3 kg (256 lb 6.4 oz).  Physical Exam  GENERAL: alert and no distress  EYES: Eyes grossly normal to inspection, PERRL and conjunctivae and sclerae normal  HENT: normal cephalic/atraumatic, ears: cystic structure noted ANITRA ear canals, ANITRA TM normal, nose and mouth without ulcers or lesions, oropharynx clear, and oral mucous membranes moist  NECK: no adenopathy, no asymmetry, masses, or scars  RESP: lungs clear to auscultation - no rales, rhonchi or wheezes  CV: regular rate and rhythm, normal S1 S2, no S3 or S4, no murmur, click or rub, no peripheral edema  ABDOMEN: soft, nontender, no hepatosplenomegaly, no masses and bowel sounds normal  MS: no gross musculoskeletal defects noted, no " edema  NEURO: Normal strength and tone, mentation intact and speech normal  PSYCH: mentation appears normal, affect normal/bright    Diagnostic Test Results:  Labs reviewed in Epic    ASSESSMENT/PLAN:   Grief reaction-stable, refilled without change    - FLUoxetine (PROZAC) 10 MG capsule  Dispense: 90 capsule; Refill: 3    Erectile dysfunction, unspecified erectile dysfunction type  Will call for refills    Mixed hyperlipidemia  Stable, refilled without change  - simvastatin (ZOCOR) 40 MG tablet  Dispense: 90 tablet; Refill: 3  - VENOUS COLLECTION  - Lipid Panel (BFP)    Routine general medical examination at a health care facility    - VENOUS COLLECTION  - Basic Metabolic Panel (BFP)  - PSA Total ([facility])  - HEMOGLOBIN A1C (BFP)    Need for vaccination    - PNEUMOCOCCAL 20 VALENT CONJUGATE (PREVNAR 20)    Screening for endocrine, metabolic and immunity disorder    - VENOUS COLLECTION  - Basic Metabolic Panel (BFP)  - HEMOGLOBIN A1C (BFP)    Screening for malignant neoplasm of prostate    - VENOUS COLLECTION  - PSA Total ([facility])    Ear canal mass, bilateral-new masses noted, pt will see ENT of his choosing, he will call for referral if needed        Patient has been advised of split billing requirements and indicates understanding: Yes      Counseling  Reviewed preventive health counseling, as reflected in patient instructions       Regular exercise       Healthy diet/nutrition       Vision screening       Colorectal cancer screening       Prostate cancer screening        He reports that he has never smoked. He has never used smokeless tobacco.            Signed Electronically by: Sly Ellington PA-C

## 2025-05-07 NOTE — NURSING NOTE
Chief Complaint   Patient presents with    Physical     Pt is here for a fasting CPX     Pre-visit Screening:  Immunizations:  up to date  Colonoscopy:  is up to date  Mammogram: na  Asthma Action Test/Plan:  na  PHQ9:  given today   GAD7:  given today   Questioned patient about current smoking habits Pt. has never smoked.  Ok to leave detailed message on voice mail for today's visit only yes , phone # [phone] (home) [phone] (work)

## 2025-05-08 ENCOUNTER — RESULTS FOLLOW-UP (OUTPATIENT)
Dept: FAMILY MEDICINE | Facility: CLINIC | Age: 65
End: 2025-05-08

## 2025-05-08 LAB — ABBOTT PSA - QUEST: 1.25 NG/ML

## (undated) DEVICE — ESU GROUND PAD ADULT W/CORD E7507

## (undated) DEVICE — DRAPE LAP W/ARMBOARD 29410

## (undated) DEVICE — SU VICRYL 4-0 PS-2 18" UND J496H

## (undated) DEVICE — DRSG STERI STRIP 1/2X4" R1547

## (undated) DEVICE — BAG CLEAR TRASH 1.3M 39X33" P4040C

## (undated) DEVICE — GLOVE PROTEXIS W/NEU-THERA 7.5  2D73TE75

## (undated) DEVICE — PREP SCRUB SOL EXIDINE 4% CHG 4OZ 29002-404

## (undated) DEVICE — LINEN TOWEL PACK X5 5464

## (undated) DEVICE — LINEN TOWEL PACK X10 5473

## (undated) DEVICE — SU VICRYL 3-0 SH 27" UND J416H

## (undated) DEVICE — LINEN HALF SHEET 5512

## (undated) DEVICE — BLADE CLIPPER 3M 9670

## (undated) DEVICE — PACK MINOR CUSTOM RIDGES SBA32RMRMA

## (undated) DEVICE — LINEN FULL SHEET 5511

## (undated) DEVICE — DRAIN PENROSE 0.50"X18" LATEX FREE GR203

## (undated) DEVICE — CLEANSER WOUND IRRISEPT 0.05% CHG IRRISEPT-403

## (undated) DEVICE — SU VICRYL 3-0 TIE 12X18" J904T

## (undated) DEVICE — SU PDS II 2-0 SH 27" Z317H

## (undated) DEVICE — PREP SKIN SCRUB TRAY 4461A

## (undated) DEVICE — ESU ELEC BLADE 2.75" COATED/INSULATED E1455

## (undated) DEVICE — GLOVE PROTEXIS BLUE W/NEU-THERA 8.0  2D73EB80

## (undated) RX ORDER — EPHEDRINE SULFATE 50 MG/ML
INJECTION, SOLUTION INTRAMUSCULAR; INTRAVENOUS; SUBCUTANEOUS
Status: DISPENSED
Start: 2017-11-03

## (undated) RX ORDER — BUPIVACAINE HYDROCHLORIDE AND EPINEPHRINE 2.5; 5 MG/ML; UG/ML
INJECTION, SOLUTION EPIDURAL; INFILTRATION; INTRACAUDAL; PERINEURAL
Status: DISPENSED
Start: 2017-11-03

## (undated) RX ORDER — FENTANYL CITRATE 50 UG/ML
INJECTION, SOLUTION INTRAMUSCULAR; INTRAVENOUS
Status: DISPENSED
Start: 2017-11-03

## (undated) RX ORDER — CEFAZOLIN SODIUM 2 G/100ML
INJECTION, SOLUTION INTRAVENOUS
Status: DISPENSED
Start: 2017-11-03

## (undated) RX ORDER — PROPOFOL 10 MG/ML
INJECTION, EMULSION INTRAVENOUS
Status: DISPENSED
Start: 2017-11-03

## (undated) RX ORDER — LIDOCAINE HYDROCHLORIDE 10 MG/ML
INJECTION, SOLUTION EPIDURAL; INFILTRATION; INTRACAUDAL; PERINEURAL
Status: DISPENSED
Start: 2017-11-03

## (undated) RX ORDER — GLYCOPYRROLATE 0.2 MG/ML
INJECTION INTRAMUSCULAR; INTRAVENOUS
Status: DISPENSED
Start: 2017-11-03

## (undated) RX ORDER — ONDANSETRON 2 MG/ML
INJECTION INTRAMUSCULAR; INTRAVENOUS
Status: DISPENSED
Start: 2017-11-03

## (undated) RX ORDER — DEXAMETHASONE SODIUM PHOSPHATE 4 MG/ML
INJECTION, SOLUTION INTRA-ARTICULAR; INTRALESIONAL; INTRAMUSCULAR; INTRAVENOUS; SOFT TISSUE
Status: DISPENSED
Start: 2017-11-03